# Patient Record
Sex: FEMALE | Race: BLACK OR AFRICAN AMERICAN | NOT HISPANIC OR LATINO | URBAN - METROPOLITAN AREA
[De-identification: names, ages, dates, MRNs, and addresses within clinical notes are randomized per-mention and may not be internally consistent; named-entity substitution may affect disease eponyms.]

---

## 2017-12-26 ENCOUNTER — EMERGENCY (EMERGENCY)
Facility: HOSPITAL | Age: 37
LOS: 0 days | Discharge: HOME | End: 2017-12-26

## 2017-12-26 DIAGNOSIS — K08.89 OTHER SPECIFIED DISORDERS OF TEETH AND SUPPORTING STRUCTURES: ICD-10-CM

## 2023-04-03 ENCOUNTER — NON-APPOINTMENT (OUTPATIENT)
Age: 43
End: 2023-04-03

## 2023-04-04 ENCOUNTER — EMERGENCY (EMERGENCY)
Facility: HOSPITAL | Age: 43
LOS: 0 days | Discharge: HOME | End: 2023-04-04
Attending: EMERGENCY MEDICINE
Payer: COMMERCIAL

## 2023-04-04 VITALS
DIASTOLIC BLOOD PRESSURE: 60 MMHG | HEIGHT: 61 IN | TEMPERATURE: 98 F | RESPIRATION RATE: 18 BRPM | WEIGHT: 147.05 LBS | HEART RATE: 79 BPM | SYSTOLIC BLOOD PRESSURE: 127 MMHG | OXYGEN SATURATION: 100 %

## 2023-04-04 DIAGNOSIS — R11.10 VOMITING, UNSPECIFIED: ICD-10-CM

## 2023-04-04 DIAGNOSIS — Z87.891 PERSONAL HISTORY OF NICOTINE DEPENDENCE: ICD-10-CM

## 2023-04-04 DIAGNOSIS — Z91.013 ALLERGY TO SEAFOOD: ICD-10-CM

## 2023-04-04 DIAGNOSIS — Z88.0 ALLERGY STATUS TO PENICILLIN: ICD-10-CM

## 2023-04-04 DIAGNOSIS — R10.11 RIGHT UPPER QUADRANT PAIN: ICD-10-CM

## 2023-04-04 DIAGNOSIS — R07.81 PLEURODYNIA: ICD-10-CM

## 2023-04-04 DIAGNOSIS — R11.2 NAUSEA WITH VOMITING, UNSPECIFIED: ICD-10-CM

## 2023-04-04 DIAGNOSIS — M54.9 DORSALGIA, UNSPECIFIED: ICD-10-CM

## 2023-04-04 LAB
ALBUMIN SERPL ELPH-MCNC: 4.1 G/DL — SIGNIFICANT CHANGE UP (ref 3.5–5.2)
ALP SERPL-CCNC: 102 U/L — SIGNIFICANT CHANGE UP (ref 30–115)
ALT FLD-CCNC: 8 U/L — SIGNIFICANT CHANGE UP (ref 0–41)
ANION GAP SERPL CALC-SCNC: 10 MMOL/L — SIGNIFICANT CHANGE UP (ref 7–14)
APPEARANCE UR: CLEAR — SIGNIFICANT CHANGE UP
AST SERPL-CCNC: 12 U/L — SIGNIFICANT CHANGE UP (ref 0–41)
BASOPHILS # BLD AUTO: 0.03 K/UL — SIGNIFICANT CHANGE UP (ref 0–0.2)
BASOPHILS NFR BLD AUTO: 0.4 % — SIGNIFICANT CHANGE UP (ref 0–1)
BILIRUB SERPL-MCNC: <0.2 MG/DL — SIGNIFICANT CHANGE UP (ref 0.2–1.2)
BILIRUB UR-MCNC: NEGATIVE — SIGNIFICANT CHANGE UP
BUN SERPL-MCNC: 11 MG/DL — SIGNIFICANT CHANGE UP (ref 10–20)
CALCIUM SERPL-MCNC: 9.3 MG/DL — SIGNIFICANT CHANGE UP (ref 8.4–10.5)
CHLORIDE SERPL-SCNC: 104 MMOL/L — SIGNIFICANT CHANGE UP (ref 98–110)
CO2 SERPL-SCNC: 22 MMOL/L — SIGNIFICANT CHANGE UP (ref 17–32)
COLOR SPEC: YELLOW — SIGNIFICANT CHANGE UP
CREAT SERPL-MCNC: 0.7 MG/DL — SIGNIFICANT CHANGE UP (ref 0.7–1.5)
DIFF PNL FLD: NEGATIVE — SIGNIFICANT CHANGE UP
EGFR: 111 ML/MIN/1.73M2 — SIGNIFICANT CHANGE UP
EOSINOPHIL # BLD AUTO: 0.08 K/UL — SIGNIFICANT CHANGE UP (ref 0–0.7)
EOSINOPHIL NFR BLD AUTO: 1.1 % — SIGNIFICANT CHANGE UP (ref 0–8)
GLUCOSE SERPL-MCNC: 97 MG/DL — SIGNIFICANT CHANGE UP (ref 70–99)
GLUCOSE UR QL: NEGATIVE — SIGNIFICANT CHANGE UP
HCG SERPL QL: NEGATIVE — SIGNIFICANT CHANGE UP
HCT VFR BLD CALC: 40.9 % — SIGNIFICANT CHANGE UP (ref 37–47)
HGB BLD-MCNC: 12.9 G/DL — SIGNIFICANT CHANGE UP (ref 12–16)
IMM GRANULOCYTES NFR BLD AUTO: 0.1 % — SIGNIFICANT CHANGE UP (ref 0.1–0.3)
KETONES UR-MCNC: ABNORMAL
LEUKOCYTE ESTERASE UR-ACNC: NEGATIVE — SIGNIFICANT CHANGE UP
LIDOCAIN IGE QN: 19 U/L — SIGNIFICANT CHANGE UP (ref 7–60)
LYMPHOCYTES # BLD AUTO: 2.61 K/UL — SIGNIFICANT CHANGE UP (ref 1.2–3.4)
LYMPHOCYTES # BLD AUTO: 35.9 % — SIGNIFICANT CHANGE UP (ref 20.5–51.1)
MCHC RBC-ENTMCNC: 29.3 PG — SIGNIFICANT CHANGE UP (ref 27–31)
MCHC RBC-ENTMCNC: 31.5 G/DL — LOW (ref 32–37)
MCV RBC AUTO: 92.7 FL — SIGNIFICANT CHANGE UP (ref 81–99)
MONOCYTES # BLD AUTO: 0.46 K/UL — SIGNIFICANT CHANGE UP (ref 0.1–0.6)
MONOCYTES NFR BLD AUTO: 6.3 % — SIGNIFICANT CHANGE UP (ref 1.7–9.3)
NEUTROPHILS # BLD AUTO: 4.08 K/UL — SIGNIFICANT CHANGE UP (ref 1.4–6.5)
NEUTROPHILS NFR BLD AUTO: 56.2 % — SIGNIFICANT CHANGE UP (ref 42.2–75.2)
NITRITE UR-MCNC: NEGATIVE — SIGNIFICANT CHANGE UP
NRBC # BLD: 0 /100 WBCS — SIGNIFICANT CHANGE UP (ref 0–0)
PH UR: 6 — SIGNIFICANT CHANGE UP (ref 5–8)
PLATELET # BLD AUTO: 247 K/UL — SIGNIFICANT CHANGE UP (ref 130–400)
POTASSIUM SERPL-MCNC: 4.6 MMOL/L — SIGNIFICANT CHANGE UP (ref 3.5–5)
POTASSIUM SERPL-SCNC: 4.6 MMOL/L — SIGNIFICANT CHANGE UP (ref 3.5–5)
PROT SERPL-MCNC: 6.6 G/DL — SIGNIFICANT CHANGE UP (ref 6–8)
PROT UR-MCNC: SIGNIFICANT CHANGE UP
RBC # BLD: 4.41 M/UL — SIGNIFICANT CHANGE UP (ref 4.2–5.4)
RBC # FLD: 12.2 % — SIGNIFICANT CHANGE UP (ref 11.5–14.5)
SODIUM SERPL-SCNC: 136 MMOL/L — SIGNIFICANT CHANGE UP (ref 135–146)
SP GR SPEC: 1.03 — SIGNIFICANT CHANGE UP (ref 1.01–1.03)
UROBILINOGEN FLD QL: SIGNIFICANT CHANGE UP
WBC # BLD: 7.27 K/UL — SIGNIFICANT CHANGE UP (ref 4.8–10.8)
WBC # FLD AUTO: 7.27 K/UL — SIGNIFICANT CHANGE UP (ref 4.8–10.8)

## 2023-04-04 PROCEDURE — 87086 URINE CULTURE/COLONY COUNT: CPT

## 2023-04-04 PROCEDURE — 85025 COMPLETE CBC W/AUTO DIFF WBC: CPT

## 2023-04-04 PROCEDURE — 76705 ECHO EXAM OF ABDOMEN: CPT | Mod: 26

## 2023-04-04 PROCEDURE — 81003 URINALYSIS AUTO W/O SCOPE: CPT

## 2023-04-04 PROCEDURE — 83690 ASSAY OF LIPASE: CPT

## 2023-04-04 PROCEDURE — 99285 EMERGENCY DEPT VISIT HI MDM: CPT

## 2023-04-04 PROCEDURE — 96375 TX/PRO/DX INJ NEW DRUG ADDON: CPT

## 2023-04-04 PROCEDURE — 36415 COLL VENOUS BLD VENIPUNCTURE: CPT

## 2023-04-04 PROCEDURE — 76705 ECHO EXAM OF ABDOMEN: CPT

## 2023-04-04 PROCEDURE — 71046 X-RAY EXAM CHEST 2 VIEWS: CPT | Mod: 26

## 2023-04-04 PROCEDURE — 71046 X-RAY EXAM CHEST 2 VIEWS: CPT

## 2023-04-04 PROCEDURE — 99284 EMERGENCY DEPT VISIT MOD MDM: CPT | Mod: 25

## 2023-04-04 PROCEDURE — 84703 CHORIONIC GONADOTROPIN ASSAY: CPT

## 2023-04-04 PROCEDURE — 80053 COMPREHEN METABOLIC PANEL: CPT

## 2023-04-04 PROCEDURE — 96374 THER/PROPH/DIAG INJ IV PUSH: CPT

## 2023-04-04 RX ORDER — SODIUM CHLORIDE 9 MG/ML
1000 INJECTION INTRAMUSCULAR; INTRAVENOUS; SUBCUTANEOUS ONCE
Refills: 0 | Status: COMPLETED | OUTPATIENT
Start: 2023-04-04 | End: 2023-04-04

## 2023-04-04 RX ORDER — FAMOTIDINE 10 MG/ML
20 INJECTION INTRAVENOUS ONCE
Refills: 0 | Status: COMPLETED | OUTPATIENT
Start: 2023-04-04 | End: 2023-04-04

## 2023-04-04 RX ORDER — EPINEPHRINE 0.3 MG/.3ML
0.3 INJECTION INTRAMUSCULAR; SUBCUTANEOUS
Qty: 1 | Refills: 0
Start: 2023-04-04

## 2023-04-04 RX ORDER — ONDANSETRON 8 MG/1
4 TABLET, FILM COATED ORAL ONCE
Refills: 0 | Status: COMPLETED | OUTPATIENT
Start: 2023-04-04 | End: 2023-04-04

## 2023-04-04 RX ADMIN — SODIUM CHLORIDE 1000 MILLILITER(S): 9 INJECTION INTRAMUSCULAR; INTRAVENOUS; SUBCUTANEOUS at 20:40

## 2023-04-04 RX ADMIN — ONDANSETRON 4 MILLIGRAM(S): 8 TABLET, FILM COATED ORAL at 20:35

## 2023-04-04 RX ADMIN — FAMOTIDINE 20 MILLIGRAM(S): 10 INJECTION INTRAVENOUS at 20:40

## 2023-04-04 NOTE — ED ADULT TRIAGE NOTE - CHIEF COMPLAINT QUOTE
pt sent by [facility], pt went to [facility] for n/v and abdominal pain. pt states she had allergic reaction on sunday to a pastry and took PO benadryl to relieve symptoms.

## 2023-04-04 NOTE — ED ADULT NURSE NOTE - CHIEF COMPLAINT QUOTE
pt sent by [facility], pt went to [facility] for n/v and abdominal pain. pt states she had allergic reaction on sunday to a pastry and took PO benadryl to relieve symptoms.

## 2023-04-04 NOTE — ED PROVIDER NOTE - ATTENDING APP SHARED VISIT CONTRIBUTION OF CARE
43 year-old female with past medical history of benign breast cyst and food allergies (shellfish, possibly other things) presents to the ER today for nausea/vomiting X1, associated with RUQ pain that radiates around to her back.  Pain is intermittent in nature and been sharp. Today pain occurred after eating. States she noticed pain to the lower right rib area, which sometimes radiates to the right upper quadrant area as well.  Denies any diarrhea/cough/URI symptoms/chest pain/shortness of breath/palpitations/rash/trauma/leg swelling/calf tenderness.  Denies  complaints, denies any GYN complaints.  LMP March 26.    On exam patient has some mild discomfort with palpation to the right anterior   lower ribs/right upper quadrant area, no mass, nondistended, no rebound or guarding, + BS, has some radiation to the right posterior rib region with no overlying skin changes/swelling/bruising to the back.  No flank tenderness.  Remainder of exam as per PA note    A/P we will check labs, x-ray, right upper quadrant sono and reassess    ALL: pcn-->hives, shellfish-->throat swelling  PMH denies  Meds denies  SH: former smoker, no etoh  [facility] [address] [facility]

## 2023-04-04 NOTE — ED PROVIDER NOTE - PHYSICAL EXAMINATION
VITAL SIGNS: I have reviewed nursing notes and confirm.  CONSTITUTIONAL:  in no acute distress.  SKIN: Skin exam is warm and dry, no acute rash.  HEAD: Normocephalic; atraumatic.  EYES: PERRL, EOM intact; conjunctiva and sclera clear.  ENT: No nasal discharge; airway clear.  CARD: S1, S2 normal; no murmurs, gallops, or rubs. Regular rate and rhythm.  RESP: No wheezes, rales or rhonchi. Speaking in full sentences.   ABD: Normal bowel sounds; soft; non-distended; RUQ tenderness; No rebound or guarding. No CVA tenderness.  EXT: Normal ROM. No clubbing, cyanosis or edema.

## 2023-04-04 NOTE — ED PROVIDER NOTE - NSFOLLOWUPINSTRUCTIONS_ED_ALL_ED_FT
Please follow up with your primary care doctor and gastroenterology in the next 1-3 days    ********* Our Emergency Department Referral Coordinators will be reaching out to you in the next 24-48 hours from 9:00am to 5:00pm with a follow up appointment. Please expect a phone call from the hospital in that time frame. If you do not receive a call or if you have any questions or concerns, you can reach them at [phone] or [phone].     Acute Abdominal Pain    WHAT YOU NEED TO KNOW:    The cause of your abdominal pain may not be found. If a cause is found, treatment will depend on what the cause is.     DISCHARGE INSTRUCTIONS:    Return to the emergency department if:     You vomit blood or cannot stop vomiting.      You have blood in your bowel movement or it looks like tar.       You have bleeding from your rectum.       Your abdomen is larger than usual, more painful, and hard.       You have severe pain in your abdomen.       You stop passing gas and having bowel movements.       You feel weak, dizzy, or faint.    Contact your healthcare provider if:     You have a fever.      You have new signs and symptoms.      Your symptoms do not get better with treatment.       You have questions or concerns about your condition or care.    Medicines may be given to decrease pain, treat an infection, and manage your symptoms. Take your medicine as directed. Call your healthcare provider if you think your medicine is not helping or if you have side effects. Tell him if you are allergic to any medicine. Keep a list of the medicines, vitamins, and herbs you take. Include the amounts, and when and why you take them. Bring the list or the pill bottles to follow-up visits. Carry your medicine list with you in case of an emergency.    Manage your symptoms:     Apply heat on your abdomen for 20 to 30 minutes every 2 hours for as many days as directed. Heat helps decrease pain and muscle spasms.       Manage your stress. Stress may cause abdominal pain. Your healthcare provider may recommend relaxation techniques and deep breathing exercises to help decrease your stress. Your healthcare provider may recommend you talk to someone about your stress or anxiety, such as a counselor or a trusted friend. Get plenty of sleep and exercise regularly.       Limit or do not drink alcohol. Alcohol can make your abdominal pain worse. Ask your healthcare provider if it is safe for you to drink alcohol. Also ask how much is safe for you to drink.       Do not smoke. Nicotine and other chemicals in cigarettes can damage your esophagus and stomach. Ask your healthcare provider for information if you currently smoke and need help to quit. E-cigarettes or smokeless tobacco still contain nicotine. Talk to your healthcare provider before you use these products.     Make changes to the food you eat as directed: Do not eat foods that cause abdominal pain or other symptoms. Eat small meals more often.     Eat more high-fiber foods if you are constipated. High-fiber foods include fruits, vegetables, whole-grain foods, and legumes.       Do not eat foods that cause gas if you have bloating. Examples include broccoli, cabbage, and cauliflower. Do not drink soda or carbonated drinks, because these may also cause gas.       Do not eat foods or drinks that contain sorbitol or fructose if you have diarrhea and bloating. Some examples are fruit juices, candy, jelly, and sugar-free gum.       Do not eat high-fat foods, such as fried foods, cheeseburgers, hot dogs, and desserts.      Limit or do not drink caffeine. Caffeine may make symptoms, such as heart burn or nausea, worse.       Drink plenty of liquids to prevent dehydration from diarrhea or vomiting. Ask your healthcare provider how much liquid to drink each day and which liquids are best for you.     Follow up with your healthcare provider as directed: Write down your questions so you remember to ask them during your visits.       © Copyright [employer] 2019 All illustrations and images included in CareNotes are the copyrighted property of [employer] or "[employer]"

## 2023-04-04 NOTE — ED PROVIDER NOTE - PATIENT PORTAL LINK FT
You can access the FollowMyHealth Patient Portal offered by [facility] by registering at the following website: [email or web address]. By joining [employer]’s FollowMyHealth portal, you will also be able to view your health information using other applications (apps) compatible with our system.

## 2023-04-04 NOTE — ED PROVIDER NOTE - OBJECTIVE STATEMENT
43 female with no history presents to ED for right upper quadrant pain radiating to her back that started today.  Patient states she was at work this morning when she started to feel the symptoms come on she had 1 episode of vomiting this morning associated with eating.  No fevers or chills but states that the pain is intermittent in its intensity.  Went to [facility] today and was sent to ED for further evaluation.  Denies CP, SOB, dysuria, hematuria, diarrhea.  LMP March 26

## 2023-04-04 NOTE — ED PROVIDER NOTE - CLINICAL SUMMARY MEDICAL DECISION MAKING FREE TEXT BOX
43 year-old female with past medical history of benign breast cyst and food allergies (shellfish, possibly other things) presents to the ER today for nausea/vomiting X1, associated with RUQ pain that radiates around to her back.  Pain is intermittent in nature and been sharp. Today pain occurred after eating. States she noticed pain to the lower right rib area, which sometimes radiates to the right upper quadrant area as well.  Denies any diarrhea/cough/URI symptoms/chest pain/shortness of breath/palpitations/rash/trauma/leg swelling/calf tenderness.  Denies  complaints, denies any GYN complaints.  LMP March 26.    Workup reviewed. No acute findings. Pt feeling better post meds. Requesting to go home. TO dc with referral to GI

## 2023-04-06 LAB
CULTURE RESULTS: SIGNIFICANT CHANGE UP
SPECIMEN SOURCE: SIGNIFICANT CHANGE UP

## 2024-03-13 ENCOUNTER — EMERGENCY (EMERGENCY)
Facility: HOSPITAL | Age: 44
LOS: 0 days | Discharge: HOME | End: 2024-03-13
Attending: EMERGENCY MEDICINE
Payer: COMMERCIAL

## 2024-03-13 VITALS
HEART RATE: 71 BPM | SYSTOLIC BLOOD PRESSURE: 128 MMHG | WEIGHT: 138.01 LBS | DIASTOLIC BLOOD PRESSURE: 78 MMHG | TEMPERATURE: 99 F | RESPIRATION RATE: 18 BRPM | OXYGEN SATURATION: 96 %

## 2024-03-13 DIAGNOSIS — Z20.822 CONTACT WITH AND (SUSPECTED) EXPOSURE TO COVID-19: ICD-10-CM

## 2024-03-13 DIAGNOSIS — R07.0 PAIN IN THROAT: ICD-10-CM

## 2024-03-13 DIAGNOSIS — R51.9 HEADACHE, UNSPECIFIED: ICD-10-CM

## 2024-03-13 DIAGNOSIS — Z88.0 ALLERGY STATUS TO PENICILLIN: ICD-10-CM

## 2024-03-13 DIAGNOSIS — B34.9 VIRAL INFECTION, UNSPECIFIED: ICD-10-CM

## 2024-03-13 DIAGNOSIS — Z91.041 RADIOGRAPHIC DYE ALLERGY STATUS: ICD-10-CM

## 2024-03-13 DIAGNOSIS — Z91.013 ALLERGY TO SEAFOOD: ICD-10-CM

## 2024-03-13 LAB
FLUAV AG NPH QL: SIGNIFICANT CHANGE UP
FLUBV AG NPH QL: SIGNIFICANT CHANGE UP
RSV RNA NPH QL NAA+NON-PROBE: SIGNIFICANT CHANGE UP
SARS-COV-2 RNA SPEC QL NAA+PROBE: SIGNIFICANT CHANGE UP

## 2024-03-13 PROCEDURE — 99283 EMERGENCY DEPT VISIT LOW MDM: CPT | Mod: 25

## 2024-03-13 PROCEDURE — 99284 EMERGENCY DEPT VISIT MOD MDM: CPT

## 2024-03-13 PROCEDURE — 0241U: CPT

## 2024-03-13 PROCEDURE — 71046 X-RAY EXAM CHEST 2 VIEWS: CPT | Mod: 26

## 2024-03-13 PROCEDURE — 71046 X-RAY EXAM CHEST 2 VIEWS: CPT

## 2024-03-13 RX ORDER — DEXAMETHASONE 0.5 MG/5ML
10 ELIXIR ORAL ONCE
Refills: 0 | Status: COMPLETED | OUTPATIENT
Start: 2024-03-13 | End: 2024-03-13

## 2024-03-13 RX ADMIN — Medication 10 MILLIGRAM(S): at 15:14

## 2024-03-13 NOTE — ED PROVIDER NOTE - PHYSICAL EXAMINATION
VITAL SIGNS: I have reviewed nursing notes and confirm.  CONSTITUTIONAL:  in no acute distress.  SKIN: Skin exam is warm and dry, no acute rash.  HEAD: Normocephalic; atraumatic.  EYES:  EOM intact; conjunctiva and sclera clear.  ENT: No nasal discharge; airway clear  NECK: Supple; non tender.  CARD: S1, S2 normal; no murmurs, gallops, or rubs. Regular rate and rhythm.  RESP: No wheezes, rales or rhonchi. Speaking in full sentences.   ABD: soft; non-distended; non-tender; No rebound or guarding.

## 2024-03-13 NOTE — ED PROVIDER NOTE - CLINICAL SUMMARY MEDICAL DECISION MAKING FREE TEXT BOX
44-year-old female with no stated PMHx, in ER with complaints of URI symptoms for the past 3 days.  + Nasal congestion/rhinorrhea, + sore throat, + mild cough, + chills, + HA, + body aches.  Denies any CP/SOB, no abdominal pain.  No N/V/D.  No urinary symptoms.  No extremity pain/swelling.  No rash.  No recent travel.  PE - nad, nc/at, eomi, perrl, op - clear, mmm, no pharyngeal erythema, neck supple, cta b/l, no w/r/r, rrr, abd- soft, nt/nd, nabs, from x 4, no LE swelling/tenderness, A&O x 3, cn 2-12 intact, no focal motor/sensory deficits.   -CXR negative, nasal swab sent.  Patient well-appearing in ER, to [place] home.  Told follow-up PMD and to return to ER if she feels worse, or for any new/concerning symptoms.  Patient understands and agrees with plan.

## 2024-03-13 NOTE — ED PROVIDER NOTE - OBJECTIVE STATEMENT
44 female no medical history presenting to ED for evaluation of headache and associated flulike symptoms for the last 3 days.  Patient states she has had associated body aches, throat pain, headache.  Denies any recent travel/known sick contacts, fevers, chills, N, V, D, abdominal pain, cough

## 2024-03-13 NOTE — ED PROVIDER NOTE - PATIENT PORTAL LINK FT
You can access the FollowMyHealth Patient Portal offered by [facility] by registering at the following website: [email or web address]. By joining [employer]’s FollowMyHealth portal, you will also be able to view your health information using other applications (apps) compatible with our system.

## 2024-06-10 ENCOUNTER — EMERGENCY (EMERGENCY)
Facility: HOSPITAL | Age: 44
LOS: 0 days | Discharge: HOME | End: 2024-06-10
Attending: EMERGENCY MEDICINE
Payer: COMMERCIAL

## 2024-06-10 VITALS
HEART RATE: 89 BPM | WEIGHT: 154.1 LBS | DIASTOLIC BLOOD PRESSURE: 51 MMHG | OXYGEN SATURATION: 98 % | SYSTOLIC BLOOD PRESSURE: 133 MMHG | HEIGHT: 60 IN | TEMPERATURE: 98 F | RESPIRATION RATE: 18 BRPM

## 2024-06-10 DIAGNOSIS — F17.200 NICOTINE DEPENDENCE, UNSPECIFIED, UNCOMPLICATED: ICD-10-CM
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
## 2024-06-10 DIAGNOSIS — S69.81XA OTHER SPECIFIED INJURIES OF RIGHT WRIST, HAND AND FINGER(S), INITIAL ENCOUNTER: ICD-10-CM

## 2024-06-10 DIAGNOSIS — Z91.041 RADIOGRAPHIC DYE ALLERGY STATUS: ICD-10-CM

## 2024-06-10 DIAGNOSIS — Z91.013 ALLERGY TO SEAFOOD: ICD-10-CM
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
## 2024-06-10 DIAGNOSIS — Z88.0 ALLERGY STATUS TO PENICILLIN: ICD-10-CM

## 2024-06-10 DIAGNOSIS — M25.531 PAIN IN RIGHT WRIST: ICD-10-CM

## 2024-06-10 PROCEDURE — 29125 APPL SHORT ARM SPLINT STATIC: CPT | Mod: RT

## 2024-06-10 PROCEDURE — 73130 X-RAY EXAM OF HAND: CPT | Mod: 26,RT

## 2024-06-10 PROCEDURE — 99284 EMERGENCY DEPT VISIT MOD MDM: CPT | Mod: 25

## 2024-06-10 PROCEDURE — 73130 X-RAY EXAM OF HAND: CPT | Mod: RT

## 2024-06-10 PROCEDURE — 99283 EMERGENCY DEPT VISIT LOW MDM: CPT | Mod: 25

## 2024-06-10 RX ORDER — ACETAMINOPHEN 500 MG
975 TABLET ORAL ONCE
Refills: 0 | Status: COMPLETED | OUTPATIENT
Start: 2024-06-10 | End: 2024-06-10

## 2024-06-10 RX ADMIN — Medication 975 MILLIGRAM(S): at 18:32
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
## 2024-06-10 NOTE — ED PROVIDER NOTE - PATIENT PORTAL LINK FT
You can access the FollowMyHealth Patient Portal offered by [facility] by registering at the following website: [email or web address]. By joining [employer]’s FollowMyHealth portal, you will also be able to view your health information using other applications (apps) compatible with our system.

## 2024-06-28 ENCOUNTER — APPOINTMENT (OUTPATIENT)
Dept: ORTHOPEDIC SURGERY | Facility: CLINIC | Age: 44
End: 2024-06-28
Payer: COMMERCIAL

## 2024-06-28 VITALS — HEIGHT: 60 IN | BODY MASS INDEX: 29.45 KG/M2 | WEIGHT: 150 LBS

## 2024-06-28 DIAGNOSIS — S63.612A UNSPECIFIED SPRAIN OF RIGHT MIDDLE FINGER, INITIAL ENCOUNTER: ICD-10-CM

## 2024-06-28 DIAGNOSIS — S63.91XA SPRAIN OF UNSPECIFIED PART OF RIGHT WRIST AND HAND, INITIAL ENCOUNTER: ICD-10-CM

## 2024-06-28 PROBLEM — Z00.00 ENCOUNTER FOR PREVENTIVE HEALTH EXAMINATION: Status: ACTIVE | Noted: 2024-06-28

## 2024-06-28 PROCEDURE — 73140 X-RAY EXAM OF FINGER(S): CPT | Mod: RT

## 2024-06-28 PROCEDURE — 99203 OFFICE O/P NEW LOW 30 MIN: CPT | Mod: ACP

## 2024-06-30 PROBLEM — Z78.9 OTHER SPECIFIED HEALTH STATUS: Chronic | Status: ACTIVE | Noted: 2024-06-10

## 2024-07-25 ENCOUNTER — APPOINTMENT (OUTPATIENT)
Dept: ORTHOPEDIC SURGERY | Facility: CLINIC | Age: 44
End: 2024-07-25

## 2024-08-04 ENCOUNTER — NON-APPOINTMENT (OUTPATIENT)
Age: 44
End: 2024-08-04

## 2024-08-08 ENCOUNTER — APPOINTMENT (OUTPATIENT)
Dept: ORTHOPEDIC SURGERY | Facility: CLINIC | Age: 44
End: 2024-08-08

## 2024-08-12 ENCOUNTER — NON-APPOINTMENT (OUTPATIENT)
Age: 44
End: 2024-08-12

## 2024-09-11 ENCOUNTER — NON-APPOINTMENT (OUTPATIENT)
Age: 44
End: 2024-09-11

## 2025-03-10 ENCOUNTER — INPATIENT (INPATIENT)
Facility: HOSPITAL | Age: 45
LOS: 0 days | Discharge: HOME | DRG: 74 | End: 2025-03-11
Attending: STUDENT IN AN ORGANIZED HEALTH CARE EDUCATION/TRAINING PROGRAM | Admitting: STUDENT IN AN ORGANIZED HEALTH CARE EDUCATION/TRAINING PROGRAM
Payer: COMMERCIAL

## 2025-03-10 VITALS
WEIGHT: 167.99 LBS | HEART RATE: 73 BPM | SYSTOLIC BLOOD PRESSURE: 129 MMHG | DIASTOLIC BLOOD PRESSURE: 68 MMHG | OXYGEN SATURATION: 100 % | TEMPERATURE: 98 F

## 2025-03-10 DIAGNOSIS — R53.1 WEAKNESS: ICD-10-CM

## 2025-03-10 LAB
ALBUMIN SERPL ELPH-MCNC: 4 G/DL — SIGNIFICANT CHANGE UP (ref 3.5–5.2)
ALP SERPL-CCNC: 138 U/L — HIGH (ref 30–115)
ALT FLD-CCNC: 17 U/L — SIGNIFICANT CHANGE UP (ref 0–41)
ANION GAP SERPL CALC-SCNC: 10 MMOL/L — SIGNIFICANT CHANGE UP (ref 7–14)
APTT BLD: 33.2 SEC — SIGNIFICANT CHANGE UP (ref 27–39.2)
AST SERPL-CCNC: 18 U/L — SIGNIFICANT CHANGE UP (ref 0–41)
BASOPHILS # BLD AUTO: 0.04 K/UL — SIGNIFICANT CHANGE UP (ref 0–0.2)
BASOPHILS NFR BLD AUTO: 0.5 % — SIGNIFICANT CHANGE UP (ref 0–1)
BILIRUB SERPL-MCNC: <0.2 MG/DL — SIGNIFICANT CHANGE UP (ref 0.2–1.2)
BUN SERPL-MCNC: 10 MG/DL — SIGNIFICANT CHANGE UP (ref 10–20)
CALCIUM SERPL-MCNC: 8.8 MG/DL — SIGNIFICANT CHANGE UP (ref 8.4–10.5)
CHLORIDE SERPL-SCNC: 101 MMOL/L — SIGNIFICANT CHANGE UP (ref 98–110)
CK MB CFR SERPL CALC: <1 NG/ML — SIGNIFICANT CHANGE UP (ref 0.6–6.3)
CK SERPL-CCNC: 103 U/L — SIGNIFICANT CHANGE UP (ref 0–225)
CO2 SERPL-SCNC: 24 MMOL/L — SIGNIFICANT CHANGE UP (ref 17–32)
CREAT SERPL-MCNC: 0.7 MG/DL — SIGNIFICANT CHANGE UP (ref 0.7–1.5)
D DIMER BLD IA.RAPID-MCNC: <150 NG/ML DDU — SIGNIFICANT CHANGE UP
EGFR: 109 ML/MIN/1.73M2 — SIGNIFICANT CHANGE UP
EGFR: 109 ML/MIN/1.73M2 — SIGNIFICANT CHANGE UP
EOSINOPHIL # BLD AUTO: 0.22 K/UL — SIGNIFICANT CHANGE UP (ref 0–0.7)
EOSINOPHIL NFR BLD AUTO: 2.5 % — SIGNIFICANT CHANGE UP (ref 0–8)
GLUCOSE BLDC GLUCOMTR-MCNC: 193 MG/DL — HIGH (ref 70–99)
GLUCOSE SERPL-MCNC: 105 MG/DL — HIGH (ref 70–99)
HCG SERPL QL: NEGATIVE — SIGNIFICANT CHANGE UP
HCT VFR BLD CALC: 41.9 % — SIGNIFICANT CHANGE UP (ref 37–47)
HGB BLD-MCNC: 13.2 G/DL — SIGNIFICANT CHANGE UP (ref 12–16)
IMM GRANULOCYTES NFR BLD AUTO: 0.3 % — SIGNIFICANT CHANGE UP (ref 0.1–0.3)
INR BLD: 0.91 RATIO — SIGNIFICANT CHANGE UP (ref 0.65–1.3)
LYMPHOCYTES # BLD AUTO: 2.51 K/UL — SIGNIFICANT CHANGE UP (ref 1.2–3.4)
LYMPHOCYTES # BLD AUTO: 28.9 % — SIGNIFICANT CHANGE UP (ref 20.5–51.1)
MCHC RBC-ENTMCNC: 28.6 PG — SIGNIFICANT CHANGE UP (ref 27–31)
MCHC RBC-ENTMCNC: 31.5 G/DL — LOW (ref 32–37)
MCV RBC AUTO: 90.9 FL — SIGNIFICANT CHANGE UP (ref 81–99)
MONOCYTES # BLD AUTO: 0.49 K/UL — SIGNIFICANT CHANGE UP (ref 0.1–0.6)
MONOCYTES NFR BLD AUTO: 5.6 % — SIGNIFICANT CHANGE UP (ref 1.7–9.3)
NEUTROPHILS # BLD AUTO: 5.4 K/UL — SIGNIFICANT CHANGE UP (ref 1.4–6.5)
NEUTROPHILS NFR BLD AUTO: 62.2 % — SIGNIFICANT CHANGE UP (ref 42.2–75.2)
NRBC BLD AUTO-RTO: 0 /100 WBCS — SIGNIFICANT CHANGE UP (ref 0–0)
PLATELET # BLD AUTO: 291 K/UL — SIGNIFICANT CHANGE UP (ref 130–400)
PMV BLD: 10.9 FL — HIGH (ref 7.4–10.4)
POTASSIUM SERPL-MCNC: 4.5 MMOL/L — SIGNIFICANT CHANGE UP (ref 3.5–5)
POTASSIUM SERPL-SCNC: 4.5 MMOL/L — SIGNIFICANT CHANGE UP (ref 3.5–5)
PROT SERPL-MCNC: 6.5 G/DL — SIGNIFICANT CHANGE UP (ref 6–8)
PROTHROM AB SERPL-ACNC: 10.7 SEC — SIGNIFICANT CHANGE UP (ref 9.95–12.87)
RBC # BLD: 4.61 M/UL — SIGNIFICANT CHANGE UP (ref 4.2–5.4)
RBC # FLD: 12.6 % — SIGNIFICANT CHANGE UP (ref 11.5–14.5)
SODIUM SERPL-SCNC: 135 MMOL/L — SIGNIFICANT CHANGE UP (ref 135–146)
TROPONIN T, HIGH SENSITIVITY RESULT: <6 NG/L — SIGNIFICANT CHANGE UP (ref 6–13)
TROPONIN T, HIGH SENSITIVITY RESULT: <6 NG/L — SIGNIFICANT CHANGE UP (ref 6–13)
WBC # BLD: 8.69 K/UL — SIGNIFICANT CHANGE UP (ref 4.8–10.8)
WBC # FLD AUTO: 8.69 K/UL — SIGNIFICANT CHANGE UP (ref 4.8–10.8)

## 2025-03-10 PROCEDURE — 99223 1ST HOSP IP/OBS HIGH 75: CPT

## 2025-03-10 PROCEDURE — 82962 GLUCOSE BLOOD TEST: CPT

## 2025-03-10 PROCEDURE — 99285 EMERGENCY DEPT VISIT HI MDM: CPT

## 2025-03-10 PROCEDURE — 84480 ASSAY TRIIODOTHYRONINE (T3): CPT

## 2025-03-10 PROCEDURE — 36415 COLL VENOUS BLD VENIPUNCTURE: CPT

## 2025-03-10 PROCEDURE — 70498 CT ANGIOGRAPHY NECK: CPT | Mod: 26

## 2025-03-10 PROCEDURE — 93005 ELECTROCARDIOGRAM TRACING: CPT

## 2025-03-10 PROCEDURE — 84484 ASSAY OF TROPONIN QUANT: CPT

## 2025-03-10 PROCEDURE — 71045 X-RAY EXAM CHEST 1 VIEW: CPT | Mod: 26

## 2025-03-10 PROCEDURE — 70551 MRI BRAIN STEM W/O DYE: CPT | Mod: 26

## 2025-03-10 PROCEDURE — 97162 PT EVAL MOD COMPLEX 30 MIN: CPT | Mod: GP

## 2025-03-10 PROCEDURE — 85379 FIBRIN DEGRADATION QUANT: CPT

## 2025-03-10 PROCEDURE — 99222 1ST HOSP IP/OBS MODERATE 55: CPT

## 2025-03-10 PROCEDURE — 70551 MRI BRAIN STEM W/O DYE: CPT | Mod: MC

## 2025-03-10 PROCEDURE — 84100 ASSAY OF PHOSPHORUS: CPT

## 2025-03-10 PROCEDURE — 0042T: CPT

## 2025-03-10 PROCEDURE — 83036 HEMOGLOBIN GLYCOSYLATED A1C: CPT

## 2025-03-10 PROCEDURE — G0378: CPT

## 2025-03-10 PROCEDURE — 84436 ASSAY OF TOTAL THYROXINE: CPT

## 2025-03-10 PROCEDURE — 93010 ELECTROCARDIOGRAM REPORT: CPT | Mod: 77

## 2025-03-10 PROCEDURE — 80061 LIPID PANEL: CPT

## 2025-03-10 PROCEDURE — 84443 ASSAY THYROID STIM HORMONE: CPT

## 2025-03-10 PROCEDURE — 80053 COMPREHEN METABOLIC PANEL: CPT

## 2025-03-10 PROCEDURE — 70496 CT ANGIOGRAPHY HEAD: CPT | Mod: 26

## 2025-03-10 PROCEDURE — 70450 CT HEAD/BRAIN W/O DYE: CPT | Mod: 26

## 2025-03-10 PROCEDURE — 83735 ASSAY OF MAGNESIUM: CPT

## 2025-03-10 PROCEDURE — 85025 COMPLETE CBC W/AUTO DIFF WBC: CPT

## 2025-03-10 PROCEDURE — 93306 TTE W/DOPPLER COMPLETE: CPT

## 2025-03-10 RX ORDER — MELATONIN 5 MG
3 TABLET ORAL AT BEDTIME
Refills: 0 | Status: DISCONTINUED | OUTPATIENT
Start: 2025-03-10 | End: 2025-03-11

## 2025-03-10 RX ORDER — ASPIRIN 325 MG
81 TABLET ORAL DAILY
Refills: 0 | Status: DISCONTINUED | OUTPATIENT
Start: 2025-03-10 | End: 2025-03-11

## 2025-03-10 RX ORDER — ASPIRIN 325 MG
81 TABLET ORAL DAILY
Refills: 0 | Status: DISCONTINUED | OUTPATIENT
Start: 2025-03-10 | End: 2025-03-10

## 2025-03-10 RX ORDER — ASPIRIN 325 MG
243 TABLET ORAL ONCE
Refills: 0 | Status: COMPLETED | OUTPATIENT
Start: 2025-03-10 | End: 2025-03-10

## 2025-03-10 RX ORDER — ASPIRIN 325 MG
244 TABLET ORAL ONCE
Refills: 0 | Status: DISCONTINUED | OUTPATIENT
Start: 2025-03-10 | End: 2025-03-10

## 2025-03-10 RX ORDER — ENOXAPARIN SODIUM 100 MG/ML
40 INJECTION SUBCUTANEOUS EVERY 24 HOURS
Refills: 0 | Status: DISCONTINUED | OUTPATIENT
Start: 2025-03-10 | End: 2025-03-11

## 2025-03-10 RX ORDER — CLOPIDOGREL BISULFATE 75 MG/1
300 TABLET, FILM COATED ORAL ONCE
Refills: 0 | Status: COMPLETED | OUTPATIENT
Start: 2025-03-10 | End: 2025-03-10

## 2025-03-10 RX ORDER — METHYLPREDNISOLONE ACETATE 80 MG/ML
125 INJECTION, SUSPENSION INTRA-ARTICULAR; INTRALESIONAL; INTRAMUSCULAR; SOFT TISSUE ONCE
Refills: 0 | Status: COMPLETED | OUTPATIENT
Start: 2025-03-10 | End: 2025-03-10

## 2025-03-10 RX ORDER — DIPHENHYDRAMINE HCL 12.5MG/5ML
50 ELIXIR ORAL ONCE
Refills: 0 | Status: COMPLETED | OUTPATIENT
Start: 2025-03-10 | End: 2025-03-10

## 2025-03-10 RX ORDER — ASPIRIN 325 MG
243 TABLET ORAL ONCE
Refills: 0 | Status: DISCONTINUED | OUTPATIENT
Start: 2025-03-10 | End: 2025-03-10

## 2025-03-10 RX ORDER — CLOPIDOGREL BISULFATE 75 MG/1
75 TABLET, FILM COATED ORAL DAILY
Refills: 0 | Status: DISCONTINUED | OUTPATIENT
Start: 2025-03-10 | End: 2025-03-11

## 2025-03-10 RX ORDER — ATORVASTATIN CALCIUM 80 MG/1
80 TABLET, FILM COATED ORAL AT BEDTIME
Refills: 0 | Status: DISCONTINUED | OUTPATIENT
Start: 2025-03-10 | End: 2025-03-11

## 2025-03-10 RX ORDER — ACETAMINOPHEN 500 MG/5ML
650 LIQUID (ML) ORAL EVERY 6 HOURS
Refills: 0 | Status: DISCONTINUED | OUTPATIENT
Start: 2025-03-10 | End: 2025-03-11

## 2025-03-10 RX ADMIN — CLOPIDOGREL BISULFATE 300 MILLIGRAM(S): 75 TABLET, FILM COATED ORAL at 21:38

## 2025-03-10 RX ADMIN — Medication 81 MILLIGRAM(S): at 21:37

## 2025-03-10 RX ADMIN — Medication 50 MILLIGRAM(S): at 12:12

## 2025-03-10 RX ADMIN — METHYLPREDNISOLONE ACETATE 125 MILLIGRAM(S): 80 INJECTION, SUSPENSION INTRA-ARTICULAR; INTRALESIONAL; INTRAMUSCULAR; SOFT TISSUE at 12:10

## 2025-03-10 RX ADMIN — Medication 243 MILLIGRAM(S): at 21:37

## 2025-03-10 RX ADMIN — ATORVASTATIN CALCIUM 80 MILLIGRAM(S): 80 TABLET, FILM COATED ORAL at 21:37

## 2025-03-10 NOTE — CHART NOTE - NSCHARTNOTEFT_GEN_A_CORE
Neurovascular Acceptance Note  Accepting Neurologist: Korey Lopez  Acceptance date: 3/10/25    Patient is a 46 yo right handed  female with npmhx , not on any meications or supplements at home, + smoker (3-4 cigarettes /day), family history of father with Heart disease and stents at age 61 yo, mother + smoker with ? COPD, denies any family history of stroke in young presented today for midchest pain. Patient states that she experienced chest pain last tuesday which resolved spontaneously . Her chest pain returned this morning accompanied with right arm numbness/ tingling, b/l blurry vision and light headededness and she came to ed for further evaluation. C/O chest pain trop x 2 is negative and patient was initially admitted to medical team for chest pain work up.    NIHSS 2  Mental status (0-2): 0   Questions (0-2):0   Commands (0-2):0   Best Gaze (0-2):0   VF (0-3): 0   Facial weakness (0-3):0   RUE (0-4): 0   LUE (0-4): 0   RLE (0-4): 1   LLE (0-4): 0   Sensation (0-2):1   Ataxia (0-2): 0   Aphasia (0-3): 0   Dysarthria (0-2):0   Extinction (0-2): 0     mRs Score: 0    Labs and Imaging so far  []CTH N/C : negative for acute findings  []CT PERFUSION: No evidence of perfusion abnormality.  []CTA HEAD: No evidence of flow-limiting stenosis, occlusion or aneurysm.  []CTA NECK: No evidence of carotid or vertebral artery stenosis.  []MRI BRAIN N/C: Faint focus of restricted diffusion within the left ventral tawana raising concern for acute infarct.      #ASSESSMENT  -MRI Brain revealed Left ventral pontine acute infarct: S/p dapt load with asa 325mg +[id number] x1  -Chest Pain : Trop x 2 negative , cardiology consultation pending    PLAN:  -Transfer patient to neurology service under Dr Korey Lopez  -Continue Telemonitoring  -Neuro checks and vital signs q 4 hours  -S/p DAPT load , continue asa 81 mg + plavix 75mg q daily starting 3/11  -Lipitor 80 mg q daily  -Keep sbp 120-180  -ECHO with bubble study  -PT/REHAB evaluation and treat as needed  -Check LDL, Lipid profile, TSH  -DVT  Prophylaxis with Lovenox 40 mg sq and SCDs bilaterally  -Case was discussed with neurology attending on call Dr Korey Lopez. Neurovascular Acceptance Note  Accepting Neurologist: Korey Lopez  Acceptance date: 3/10/25    Patient is a 44 yo right handed  female with npmhx , not on any meications or supplements at home, + smoker (3-4 cigarettes /day), family history of father with Heart disease and stents at age 61 yo, mother + smoker with ? COPD, denies any family history of stroke in young presented today for midchest pain. Patient states that she experienced chest pain last tuesday which resolved spontaneously . Her chest pain returned this morning accompanied with right arm numbness/ tingling, b/l blurry vision and light headededness and she came to ed for further evaluation. C/O chest pain trop x 2 is negative and patient was initially admitted to medical team for chest pain work up.    NIHSS 2  Mental status (0-2): 0   Questions (0-2):0   Commands (0-2):0   Best Gaze (0-2):0   VF (0-3): 0   Facial weakness (0-3):0   RUE (0-4): 0   LUE (0-4): 0   RLE (0-4): 1   LLE (0-4): 0   Sensation (0-2):1   Ataxia (0-2): 0   Aphasia (0-3): 0   Dysarthria (0-2):0   Extinction (0-2): 0     mRs Score: 0    Labs and Imaging so far  []CTH N/C : negative for acute findings  []CT PERFUSION: No evidence of perfusion abnormality.  []CTA HEAD: No evidence of flow-limiting stenosis, occlusion or aneurysm.  []CTA NECK: No evidence of carotid or vertebral artery stenosis.  []MRI BRAIN N/C: Faint focus of restricted diffusion within the left ventral tawana raising concern for acute infarct.      #ASSESSMENT  -MRI Brain revealed Left ventral pontine acute infarct: S/p dapt load with asa 325mg +[id number] x1  -Chest Pain : Trop x 2 negative , cardiology consultation pending  +Smoker    PLAN:  -Transfer patient to neurology service under Dr Korey Lopez  -Continue Telemonitoring  -Neuro checks and vital signs q 4 hours  -S/p DAPT load , continue asa 81 mg + plavix 75mg q daily starting 3/11  -Lipitor 80 mg q daily  -Keep sbp 120-180  -ECHO with bubble study  -PT/REHAB evaluation and treat as needed  -Check LDL, Lipid profile, TSH  -DVT  Prophylaxis with Lovenox 40 mg sq and SCDs bilaterally  -Smoking cessation education initiated  -Case was discussed with neurology attending on call Dr Korey Lopez. Neurovascular Acceptance Note  Accepting Neurologist: Korey Lopez  Acceptance date: 3/10/25    Patient is a 46 yo right handed  female with npmhx , not on any meications or supplements at home, + smoker (3-4 cigarettes /day), family history of father with Heart disease and stents at age 61 yo, mother + smoker with ? COPD, denies any family history of stroke in young presented today for midchest pain. Patient states that she experienced chest pain last tuesday which resolved spontaneously . Her chest pain returned this morning accompanied with right arm numbness/ tingling, b/l blurry vision and light headedness and she came to ed for further evaluation. C/O chest pain trop x 2 is negative and patient was initially admitted to medical team for chest pain work up.    NIHSS 2  Mental status (0-2): 0   Questions (0-2):0   Commands (0-2):0   Best Gaze (0-2):0   VF (0-3): 0   Facial weakness (0-3):0   RUE (0-4): 0   LUE (0-4): 0   RLE (0-4): 1   LLE (0-4): 0   Sensation (0-2):1   Ataxia (0-2): 0   Aphasia (0-3): 0   Dysarthria (0-2):0   Extinction (0-2): 0     mRs Score: 0    Labs and Imaging so far  []CTH N/C : negative for acute findings  []CT PERFUSION: No evidence of perfusion abnormality.  []CTA HEAD: No evidence of flow-limiting stenosis, occlusion or aneurysm.  []CTA NECK: No evidence of carotid or vertebral artery stenosis.  []MRI BRAIN N/C: Faint focus of restricted diffusion within the left ventral tawana raising concern for acute infarct.      #ASSESSMENT  -Left ventral pontine acute infarct: S/p dapt load with asa 325mg +[id number] x1  -Chest Pain : Trop x 2 negative , cardiology consultation pending  +Smoker    PLAN:  -Transfer patient to neurology service under Dr Korey Lopez  -Continue Telemonitoring  -Neuro checks and vital signs q 4 hours  -S/p DAPT load , continue asa 81 mg + plavix 75mg q daily starting 3/11  -Lipitor 80 mg q daily  -Keep sbp 120-180  -ECHO with bubble study  -PT/REHAB evaluation and treat as needed  -Check LDL, Lipid profile, TSH  -DVT  Prophylaxis with Lovenox 40 mg sq and SCDs bilaterally  -Smoking cessation education initiated  -Case was discussed with neurology attending on call Dr Korey Lopez. Neurovascular Acceptance Note  Accepting Neurologist: Korey Lopez  Acceptance date: 3/10/25    Patient is a 46 yo right handed  female with npmhx , not on any meications or supplements at home, + smoker (3-4 cigarettes /day), family history of father with Heart disease and stents at age 59 yo, mother + smoker with ? COPD, denies any family history of stroke in young presented today for midchest pain. Patient states that she experienced chest pain last tuesday which resolved spontaneously . Her chest pain returned this morning accompanied with right arm numbness/ tingling, b/l blurry vision and light headedness and she came to ed for further evaluation. C/O chest pain trop x 2 is negative and patient was initially admitted to medical team for chest pain work up.    NIHSS 2  Mental status (0-2): 0   Questions (0-2):0   Commands (0-2):0   Best Gaze (0-2):0   VF (0-3): 0   Facial weakness (0-3):0   RUE (0-4): 0   LUE (0-4): 0   RLE (0-4): 1   LLE (0-4): 0   Sensation (0-2):1   Ataxia (0-2): 0   Aphasia (0-3): 0   Dysarthria (0-2):0   Extinction (0-2): 0     mRs Score: 0    Labs and Imaging so far  []CTH N/C : negative for acute findings  []CT PERFUSION: No evidence of perfusion abnormality.  []CTA HEAD: No evidence of flow-limiting stenosis, occlusion or aneurysm.  []CTA NECK: No evidence of carotid or vertebral artery stenosis.  []MRI BRAIN N/C: Faint focus of restricted diffusion within the left ventral tawana raising concern for acute infarct.      #ASSESSMENT  -Left ventral pontine acute infarct: S/p dapt load with asa 325mg +[id number] x1  -Chest Pain : Trop x 2 negative , cardiology consultation pending  +Smoker    PLAN:  -Admit patient to neurology service to Dr Korey Lopez  -Continue Telemonitoring  -Neuro checks and vital signs q 4 hours  -S/p DAPT load , continue asa 81 mg + plavix 75mg q daily starting 3/11  -Lipitor 80 mg q daily  -Keep sbp 120-180  -ECHO with bubble study  -PT/REHAB evaluation and treat as needed  -Check LDL, Lipid profile, TSH  -DVT  Prophylaxis with Lovenox 40 mg sq and SCDs bilaterally  -Smoking cessation education initiated  -Case was discussed with neurology attending on call Dr Korey Lopez. Neurovascular Acceptance Note  Accepting Neurologist: Korey Lopez  Acceptance date: 3/10/25    Patient is a 46 yo right handed  female with no pmhx , not on any medications or supplements at home, + smoker (3-4 cigarettes /day), family history of father with Heart disease and stents at age 61 yo, mother + smoker with ? COPD, denies any family history of stroke in young presented today for midchest pain. Patient states that she experienced chest pain last Tuesday which resolved spontaneously . Her chest pain returned this morning accompanied with right arm numbness/ tingling, b/l blurry vision and lightheadedness and she came to ed for further evaluation. C/O chest pain trop x 2 is negative and patient was initially admitted to medical team for chest pain work up.    NIHSS 2  Mental status (0-2): 0   Questions (0-2):0   Commands (0-2):0   Best Gaze (0-2):0   VF (0-3): 0   Facial weakness (0-3):0   RUE (0-4): 0   LUE (0-4): 0   RLE (0-4): 1   LLE (0-4): 0   Sensation (0-2):1   Ataxia (0-2): 0   Aphasia (0-3): 0   Dysarthria (0-2):0   Extinction (0-2): 0     mRs Score: 0    Labs and Imaging so far  []CTH N/C : negative for acute findings  []CT PERFUSION: No evidence of perfusion abnormality.  []CTA HEAD: No evidence of flow-limiting stenosis, occlusion or aneurysm.  []CTA NECK: No evidence of carotid or vertebral artery stenosis.  []MRI BRAIN N/C: Faint focus of restricted diffusion within the left ventral tawana raising concern for acute infarct.      #ASSESSMENT  -Questionable Left ventral pontine acute infarct: pending further discussion with radiology in the AM. S/p dapt load with asa 325mg +plavix 300mg x1  -Chest Pain : Trop x 2 negative , cardiology consultation pending  +Smoker    PLAN:  -Admit patient to neurology service to Dr Korey Lopez  -Continue Telemonitoring  -Neuro checks and vital signs q 4 hours  -S/p DAPT load , continue asa 81 mg + plavix 75mg q daily starting 3/11  -Lipitor 80 mg q daily  -Keep sbp 120-180  -ECHO with bubble study  -PT/REHAB evaluation and treat as needed  -Check LDL, Lipid profile, TSH  -DVT  Prophylaxis with Lovenox 40 mg sq and SCDs bilaterally  -Smoking cessation education initiated  -Case was discussed with neurology attending on call Dr Korey Lopez.

## 2025-03-10 NOTE — CONSULT NOTE ADULT - ASSESSMENT
44yo RHF w/ prior tobacco use presenting to ED for Chest pain w/ Right hemihypoesthesia face sparing suspicious for acute stroke. LKN > 12hrs, mrs 0, NIHSS 3 (right hemihypoesthesia w/ RUE and RLE hemiparesis. Possible lesion in the IC region vs CR vs pontine however, given the onset chest pain presentation is suspicious referred symptoms secondary to angina. CTH unremarkable, CTA delayed due to prior iodine allergies. Will get cardiac w/u, pending further diagnostic stroke w/u.     Recommendation  Benadryl and steroid ppx against contrast   CTA H/N after ppx  Cardiac w/u - trops, EKG, CK  Consult cardiology consult for persistent chest pain  Consider image of C-spine if negative for stroke  46yo RHF w/ prior tobacco use presenting to ED for Chest pain w/ Right hemihypoesthesia face sparing suspicious for acute stroke. LKN > 12hrs, mrs 0, NIHSS 3 (right hemihypoesthesia w/ RUE and RLE hemiparesis. Possible lesion in the IC region vs CR vs pontine however, given the onset chest pain presentation is suspicious referred symptoms secondary to angina. CTH unremarkable, CTA delayed due to prior iodine allergies. Will get cardiac w/u, pending further diagnostic stroke w/u.     Recommendation  Benadryl and steroid ppx against contrast   CTA H/N after ppx  Cardiac w/u - trops, EKG, CK  Consult cardiology consult for persistent chest pain  MRI Brain non-MANDEEP  Consider MRI C-spine if MRI negative for stroke

## 2025-03-10 NOTE — ED PROVIDER NOTE - DIFFERENTIAL DIAGNOSIS
The differential diagnosis for patients clinical presentation includes but is not limited to:  migraine, tension, meningitis (viral, bacterial), SAH, Intracranial bleed, cluster, non-specific headache, sinusitis, viral syndrome, CVA, TIA Differential Diagnosis

## 2025-03-10 NOTE — CONSULT NOTE ADULT - ATTENDING COMMENTS
Pt is a 44 yo F with PMHx of tobacco use (recently quit), who presents with chest pain radiating into RUE, right sided numbness/heaviness. Symptoms slowly evolved since last night. Pain and effort limited movement in RUE/RLE. Able to provide 4+/5 strength with repeated encouragement and distraction. decreased light touch and pin prick in R V1-2-3, RUE/RLE. CT head without acute process. CTA head/neck without flow limiting stenosis. Agree with MRI brain. Chest pain w/u as per primary team. Pt is a 46 yo F with PMHx of tobacco use (recently quit), who presents with chest pain radiating into RUE, right sided numbness/heaviness. Symptoms slowly evolved since last night. Pain and effort limited movement in RUE/RLE. Able to provide 4+/5 strength with repeated encouragement and distraction. decreased light touch and pin prick in R V1-2-3, RUE/RLE. CT head without acute process. CTA head/neck without flow limiting stenosis. Agree with MRI brain. ASA 325mg x 1. Chest pain w/u as per primary team.

## 2025-03-10 NOTE — H&P ADULT - NSHPPHYSICALEXAM_GEN_ALL_CORE
GENERAL: NAD, lying in bed comfortably  HEAD:  Atraumatic, Normocephalic  EYES: EOMI, sclera clear  ENT: Moist mucous membranes  NECK: Supple, trachea midline  CHEST/LUNG: Clear to auscultation anteriorly bilaterally; No rales, rhonchi, wheezing, or rubs. Unlabored respirations  HEART: Regular rate and rhythm; No appreciable murmurs, rubs, or gallops  ABDOMEN: (+)BS; Soft, nontender, nondistended  EXTREMITIES:  2+ Radial Pulses, brisk capillary refill. No clubbing, cyanosis, or edema  NERVOUS SYSTEM:  A&Ox3, no noted facial droop. Moving all extremities w/o difficulty.   SKIN: No rashes or lesions to b/l forearm, face.

## 2025-03-10 NOTE — ED PROVIDER NOTE - OBJECTIVE STATEMENT
45-year-old female coming with complaints of numbness and tingling since 8 AM this morning.  Reports blurry vision and right arm numbness that started at 8 AM, had preceding chest pain since yesterday evening.  Last known well was last night, woke up at 8 AM with the symptoms.  Stroke code initiated in triage.                                                                                                                                                                                45-year-old female coming with complaints of numbness and tingling since 8 AM this morning.  Reports blurry vision and right arm numbness that started at 8 AM, had preceding chest pain since yesterday evening.  Last known well was last night, woke up at 8 AM with the symptoms.  Stroke code initiated in triage.

## 2025-03-10 NOTE — ED ADULT TRIAGE NOTE - CHIEF COMPLAINT QUOTE
c/o chest pain going to back and right arm numbness since yesterday, blurry vision started today around 8 am c/o chest pain going to back since yesterday, blurry vision and right arm numbness  started today around 8 am. FS 99. LKW 8 am c/o chest pain going to back since yesterday, blurry vision and right arm numbness  started today around 8 am. FS 99. LKW 8 am, stroke code called

## 2025-03-10 NOTE — H&P ADULT - ATTENDING COMMENTS
My note supersedes the resident's note in the event of a discrepancy -    Patient seen and examined this evening  lying in bed  in nad     T(C): 36.6 (03-10-25 @ 11:18), Max: 36.6 (03-10-25 @ 11:18)  HR: 73 (03-10-25 @ 11:18) (73 - 73)  BP: 129/68 (03-10-25 @ 11:18) (129/68 - 129/68)  RR: --  SpO2: 100% (03-10-25 @ 11:18) (100% - 100%)    acetaminophen     Tablet .. 650 milliGRAM(s) Oral every 6 hours PRN  melatonin 3 milliGRAM(s) Oral at bedtime PRN    46 y/o woman w no significant PMHx presented for RUE tingling and CP, in ED had vitals wnl, trop <6, seen by neuro as stroke code and subsequently had CT Head, premedicated for contrast then CTA Head and Neck and MR brain.    #Suspected acute infarct in left ventral tawana on MRI brain  - recall neurology to review MRI findings as they signed off earlier today  - start asa/statin  - PT/OT eval  - fall precautions  - place on tele    #CP r/o ACS  - place on tele  - trend enzymes  - check echo  - cardio cs recommended by neurology     #obesity  -diet and lifestyle modification     Progress Note Handoff  Pending Consults: cardio, neuro  Pending Tests: echo  Pending Results: echo  Family Discussion: Discussed labs, meds, ct and MRI findings and overall plan of care with pt and medical staff. All questions answered. PFD for 24hrs  Disposition: Home__x___/SNF______/Other_____/Unknown at this time_____  Spent 75 min reviewing chart, speaking with patient/family and on coordinating patient care during interdisciplinary rounds

## 2025-03-10 NOTE — ED PROVIDER NOTE - CLINICAL SUMMARY MEDICAL DECISION MAKING FREE TEXT BOX
CT head shows no acute changes and patient had negative perfusion and CT angio.  Initial troponin negative.  Patient has continued weakness to her right upper extremity which may be muscular in nature, however, concern for possible acute infarct.  Antiplatelet agent given and patient to be admitted for further neurochecks and MRI.  Labs and ED workup reviewed.    ED work up reviewed and results and plan of care discussed with patient. Patient requires admission for further work up, monitoring, and management. Need for admission discussed with patient.

## 2025-03-10 NOTE — H&P ADULT - CONVERSATION DETAILS
Explained to pt that unless otherwise stated, in a hospital everyone is treated as full code, meaning if needed in an emergency, compressions are done when a pt's heart does not beat on its own and intubation and ventilation are done when a pt cannot breathe on their own. Asked whether this is something that pt would want if ever required as a life sustaining measure.   Full code    Asked whether pt has anyone who they would want to make their healthcare decisions for them if they were too confused, not awake, or otherwise unable to do so.   Daughter Porsha Cooper [phone]   Stated her parents and partner can also make decisions on her behalf but in true time-sensitive situation would trust her daughter, who is 23 y/o and lives nearby.

## 2025-03-10 NOTE — H&P ADULT - HISTORY OF PRESENT ILLNESS
46 y/o woman w no significant PMHx presented for RUE tingling and CP, in ED had vitals wnl, trop <6, seen by neuro as stroke code and subsequently had CT Head, premedicated for contrast then CTA Head and Neck and MR brain noncon which was unremarkable, suggested to f/u as outpatient for neuro but was recommended to have cardio eval, admitted for CP workup.     Triage vitals were: 129/68, HR 73bpm, 100% on RA, 97.8F   Labs notable for: trop <6,  but otherwise wnl - no elevated WBC, Cr. Electrolytes wnl.   Imaging: CXR w slight ?cardiomegaly, CT Head, CTA Head and C spine, MR brain w/o acute stroke.   Treatments: premedicated for contrast 44 y/o woman w PMHx of uterine fibroids, presented for RUE tingling and CP, in ED had vitals wnl, trop <6, seen by neuro as stroke code and subsequently had CT Head, premedicated for contrast then CTA Head and Neck and MR brain noncon which was unremarkable, suggested to f/u as outpatient for neuro but was recommended to have cardio eval, admitted for CP workup, stroke workup.     Reports 1 week ago having 30min episode of midsternal chest pressure which resolved after taking 3 tylenol. Had been tying her shoes at the time, not exerting herself at the time, and was able to have an active rest of her day going shopping w/o difficulty. Had never had this kind of chest pressure before. Night prior to arrival around 9:30pm was making dinner when she had onset of chest pressure and tingling in the R fingers, w overall severity about 6/10 and while she was not able to sleep well, did go to bed. On waking this morning was planning to go to work but her partner brought her to the ED instead for evaluation of these symptoms. Has had some improvement in symptoms since then, and was hoping to go home. Does still have positional pain/discomfort w raising her R arm. 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   Not currently menstruating, LMP ended last week.     Triage vitals were: 129/68, HR 73bpm, 100% on RA, 97.8F   Labs notable for: trop <6,  but otherwise wnl - no elevated WBC, Cr. Electrolytes wnl.   Imaging: CXR w slight ?cardiomegaly, CT Head, CTA Head and C spine, MR brain w/o acute stroke.   Treatments: premedicated for contrast

## 2025-03-10 NOTE — CONSULT NOTE ADULT - SUBJECTIVE AND OBJECTIVE BOX
Neurology Consult    Patient is a 45y old  Female who presents with a chief complaint of chest pain w/ right arm and leg numbness     HPI:      PAST MEDICAL & SURGICAL HISTORY:  No pertinent past medical history      No significant past surgical history          FAMILY HISTORY:      Social History: (-) x 3    Allergies    iodine (Unknown)  shellfish (Unknown)  penicillin (Hives; Angioedema)    Intolerances        MEDICATIONS  (STANDING):    MEDICATIONS  (PRN):      Review of systems:    Constitutional: as per HPI  Eyes: No eye pain or discharge  ENMT:  No difficulty hearing; No sinus or throat pain  Neck: No pain or stiffness  Respiratory: No cough, wheezing, chills or hemoptysis  Cardiovascular: No chest pain, palpitations, shortness of breath, dyspnea on exertion  Gastrointestinal: No abdominal pain, nausea, vomiting or hematemesis; No diarrhea or constipation.   Genitourinary: No dysuria, frequency, hematuria or incontinence  Neurological: As per HPI  Skin: No rashes or lesions   Endocrine: No heat or cold intolerance; No hair loss  Musculoskeletal: No joint pain or swelling  Psychiatric: No depression, anxiety, mood swings  Heme/Lymph: No easy bruising or bleeding gums    Vital Signs Last 24 Hrs  T(C): 36.6 (10 Mar 2025 11:18), Max: 36.6 (10 Mar 2025 11:18)  T(F): 97.8 (10 Mar 2025 11:18), Max: 97.8 (10 Mar 2025 11:18)  HR: 73 (10 Mar 2025 11:18) (73 - 73)  BP: 129/68 (10 Mar 2025 11:18) (129/68 - 129/68)  SpO2: 100% (10 Mar 2025 11:18) (100% - 100%)    Parameters below as of 10 Mar 2025 11:18  Patient On (Oxygen Delivery Method): room air        Examination:  General:  Appearance is consistent with chronologic age.  No abnormal facies.  Gross skin survey within normal limits.    Cognitive/Language:  The patient is oriented to person, place, time and date.  Recent and remote memory intact.  Fund of knowledge is intact and normal.  Language with normal repetition, comprehension and naming.  Nondysarthric.    Eyes: intact VA, VFF.  EOMI w/o nystagmus, skew or reported double vision.  PERRL.  No ptosis/weakness of eyelid closure.    Face:  Facial sensation normal V1 - 3, no facial asymmetry.    Ears/Nose/Throat:  Hearing grossly intact b/l.  Palate elevates midline.  Tongue and uvula midline.   Motor examination:   Normal tone, bulk and range of motion.  No tenderness, twitching, tremors or involuntary movements.  Formal Muscle Strength Testing: (MRC grade R/L) 5/5 UE; 5/5 LE.  No observable drift.  Reflexes:   2+ b/l pectoralis, biceps, triceps, brachioradialis, patella and Achilles.  Plantar response downgoing b/l.  Jaw jerk, Rachel, clonus absent.  Sensory examination:   Intact to light touch and pinprick, pain, temperature and proprioception and vibration in all extremities.  Cerebellum:   FTN/HKS intact with normal TAMMIE in all limbs.  No dysmetria or dysdiadokinesia.  Gait narrow based and normal.    Labs:                     Neuroimaging:         Neurology Consult    Patient is a 45y old  Female who presents with a chief complaint of chest pain w/ right arm and leg numbness     HPI:  Ms. Cooper is a 44yo F w/ recent tobacco cessation presenting to ED accompanied by her  for chest pain w/ associated stroke like symptoms. She reported feeling well 2100 last night followed by midsternal chest pressure 8/10 30mins later while going to bed. She did not take any medication. She woke up this  morning 0600 w/ persistent chest pain and tingling of the right finger tips. She continued to experienced chest discomfort, some blurry vision w/ progressed to numbness in the right arm and leg by 0800 noticed while at work. Chest pain is worst w/ movement of the right arm as well as with deep breathing. No reproduction w/ palpation. She denied nausea and vomiting. She denied difficulty speaking, visual changes, limb weakness or gait instability. She does not take medication at home, no recent fall, trauma, illness or ill contact.   She reported midsternal chest pressure 2 weeks prior which resolved after 2hrs however, no associated paresthesia or hypesthesia. She     PAST MEDICAL & SURGICAL HISTORY:  Being Left breast mass s/p lumpectomy     FAMILY HISTORY:      Social History: occasional marijuana use, tobacco cessation 3 weeks ago. 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   Genitourinary: No dysuria, frequency, hematuria or incontinence  Neurological: As per HPI  Skin: No rashes or lesions   Musculoskeletal: No joint pain or swelling      Vital Signs Last 24 Hrs  T(C): 36.6 (10 Mar 2025 11:18), Max: 36.6 (10 Mar 2025 11:18)  T(F): 97.8 (10 Mar 2025 11:18), Max: 97.8 (10 Mar 2025 11:18)  HR: 73 (10 Mar 2025 11:18) (73 - 73)  BP: 129/68 (10 Mar 2025 11:18) (129/68 - 129/68)  SpO2: 100% (10 Mar 2025 11:18) (100% - 100%)    Parameters below as of 10 Mar 2025 11:18  Patient On (Oxygen Delivery Method): room air        Examination:  General:  Appearance is consistent with chronologic age.  No abnormal facies.  Gross skin survey within normal limits.    Cognitive/Language:  The patient is oriented to person, place, time and date.  Recent and remote memory intact.  Language with normal repetition, comprehension and naming.  Nondysarthric.    Eyes: VFF.  EOMI w/o nystagmus or reported double vision.  PERRL.  No ptosis/weakness of eyelid closure.    Face:  Facial sensation normal V1 - 3, no facial asymmetry.    Ears/Nose/Throat:  Hearing grossly intact b/l.  Palate elevates midline.  Tongue and uvula midline.   Motor examination:   Normal tone, bulk and range of motion.  No tenderness, twitching, tremors or involuntary movements.  Formal Muscle Strength Testing: (MRC grade R/L) 4+/5 UE; 4+/5 LE.    Reflexes:   2+ b/l triceps, brachioradialis, patella and Achilles. clonus absent.  Sensory examination:   decrease light touch and pinprick to right face arm and leg compare to left   Cerebellum:   FTN/HKS intact with normal TAMMIE in all limbs.  No dysmetria       Labs:                         13.2   8.69  )-----------( 291      ( 10 Mar 2025 12:05 )             41.9             Neuroimaging:  < from: CT Brain Stroke Protocol (03.10.25 @ 11:37) >  No acute territorial infarct, intracranial hemorrhage, or midline shift.    < end of copied text >

## 2025-03-10 NOTE — CHART NOTE - NSCHARTNOTEFT_GEN_A_CORE
Transfer from: [facility] ED Hold    Transfer to: Neurology       44 y/o woman w PMHx of uterine fibroids, presented for RUE tingling and CP, in ED had vitals wnl, trop <6, seen by neuro as stroke code and subsequently had CT Head, premedicated for contrast then CTA Head and Neck and MR brain noncon which was unremarkable, suggested to f/u as outpatient for neuro but was recommended to have cardio eval, admitted for CP workup, stroke workup.     Reports 1 week ago having 30min episode of midsternal chest pressure which resolved after taking 3 tylenol. Had been tying her shoes at the time, not exerting herself at the time, and was able to have an active rest of her day going shopping w/o difficulty. Had never had this kind of chest pressure before. Night prior to arrival around 9:30pm was making dinner when she had onset of chest pressure and tingling in the R fingers, w overall severity about 6/10 and while she was not able to sleep well, did go to bed. On waking this morning was planning to go to work but her partner brought her to the ED instead for evaluation of these symptoms. Has had some improvement in symptoms since then, and was hoping to go home. Does still have positional pain/discomfort w raising her R arm. 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   Not currently menstruating, LMP ended last week.     Triage vitals were: 129/68, HR 73bpm, 100% on RA, 97.8F   Labs notable for: trop <6,  but otherwise wnl - no elevated WBC, Cr. Electrolytes wnl.   Imaging: CXR w slight ?cardiomegaly, CT Head, CTA Head and C spine, MR brain w/o acute stroke.   Treatments: premedicated for contrast (03-10-25 @ 19:06)        ASSESSMENT & PLAN:     44 y/o woman w no significant PMHx presented for RUE tingling and CP, in ED had vitals wnl, trop <6, seen by neuro as stroke code and subsequently had CT Head, premedicated for contrast then CTA Head and Neck and MR brain noncon which was unremarkable, suggested to f/u as outpatient for neuro but was recommended to have cardio eval, admitted for CP workup.     #CP   Unlikely STEMI equivalent/acute ischemia. Can consider pleuritis, vasospasm, ?positional thoracic outlet syndrome w intermittent compression of brachial plexus? Low likelihood for PE, not tachycardic, not hypoxic. Recent tobacco cessation.   - repeat trop  - Cardio eval as recommended by neuro   - TTE   - d-dimer  - OT consulted    #RUE paresthesias   seen by neuro as stroke code and subsequently had CT Head, premedicated for contrast   Symptoms more consistent w nerve impingement than CVA.   - CTA Head and Neck   - MR brain noncon >> PENDING NEURO DISCUSSION - neuro independent read not consistent w radiology read.                                                                              ----------------------------------------------------  # DVT prophylaxis: Lovenox 40mg QD   # GI prophylaxis: n/a   # Diet: Regular +ensure   # Activity: Ambulate as tolerated   # Code status: FULL CODE   # Disposition: Neurology

## 2025-03-10 NOTE — ED PROVIDER NOTE - ATTENDING CONTRIBUTION TO CARE
I personally evaluated patient. I agree with the findings and plan with all documentation on chart except as documented  in my note.    45-year-old female no known past medical history presents to the emergency department with numbness, tingling, heaviness to right arm and leg since this morning.  Patient had chest pain last night to her right chest is worse with moving her arm.  Patient woke up with symptoms at 8 AM and came to the emergency department.  Patient rushed to the critical care area the emergency department and stroke code was activated.  Initial NIH score was 3 for right-sided arm numbness, drift, right leg.  Vital signs were reviewed.  EKG immediately done and shows nonspecific changes inferior leads and V3 through V5.  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  Patient was immediately evaluated by neurology at bedside.  Immediate noncontrast head CT performed.  Patient return to the emergency department for pretreatment for IV contrast CT scans.  Head CT shows no acute changes.  Will follow-up lab work, bedside echo, repeat EKG, CT angio and perfusion studies.  Will follow-up workup and reassess.

## 2025-03-10 NOTE — CHART NOTE - NSCHARTNOTEFT_GEN_A_CORE
MRI brain non con reviewed and negative. No further inpatient neurological work up at this time. Can f/u with general neurology outpatient.

## 2025-03-10 NOTE — ED PROVIDER NOTE - ATTENDING WITH...
PHYSICIAN NEXT STEPS:  Review Only    CHIEF COMPLAINT:  Chief Complaint/Protocol Used: No Contact or Duplicate Contact Call (A)  Onset: Today      ASSESSMENT:  ? Onset: Today  -------------------------------------------------------    DISPOSITION:  Disposition Recommendation: Unspecified  Questions that led to disposition:  ? Caller has already spoken with another triager and has no further questions.  Patient Directed To: Unspecified  Patient Intended Action: Take care of myself at home      CALL NOTES:  09/14/2021 at 3:39 PM by Joy Nagel  ? . Duplicate call; patient has already spoken to someone and has gotten her needs met.      DISPOSITION OVERRIDE/PROVIDER CONSULT:  Disposition Override: No Disposition Reached  Override Source: Unspecified  Consulted with PCP: No  Consulted with On-Call Physician: No    CALLER CONTACT INFO:  Name: Mannie Clay Palacios (Self)  Phone 1: [phone] (Home Phone)  Phone 2: [phone] (Mobile)      ENCOUNTER STARTED:  09/14/21 03:36:59 PM  ENCOUNTER ASSIGNED TO/CLOSED BY:  Joy Nagel @ 09/14/21 03:40:38 PM      -------------------------------------------------------    UNDERSTANDS CARE ADVICE: No    AGREES WITH CARE ADVICE: No    WILL FOLLOW CARE ADVICE: No    -------------------------------------------------------  
Resident

## 2025-03-10 NOTE — ED PROVIDER NOTE - PHYSICAL EXAMINATION
VITAL SIGNS: I have reviewed nursing notes and confirm.  CONSTITUTIONAL: well-appearing, non-toxic, NAD  SKIN: Warm dry, normal skin turgor  HEAD: NCAT  EYES: EOMI, PERRLA, no scleral icterus  ENT: Moist mucous membranes, normal pharynx with no erythema or exudates  NECK: Supple; non tender. Full ROM. No cervical LAD  CARD: RRR, no murmurs, rubs or gallops  RESP: clear to ausculation b/l.  No rales, rhonchi, or wheezing.  ABD: soft, + BS, non-tender, non-distended, no rebound or guarding. No CVA tenderness  EXT: Full ROM, no bony tenderness, no pedal edema, no calf tenderness  NEURO: normal motor. normal sensory. CN II-XII intact. Cerebellar testing normal. Normal gait.  Muscle Strength Testing: (MRC grade R/L) 4+/5 UE; 4+/5 LE.    Reflexes:   2+ b/l triceps, brachioradialis, patella and Achilles. clonus absent.  Sensory examination:   decrease light touch and pinprick to right face arm and leg compare to left   Cerebellum:   FTN/HKS intact with normal TAMMIE in all limbs.  No dysmetria   PSYCH: Cooperative, appropriate.

## 2025-03-10 NOTE — STROKE CODE NOTE - NIH STROKE SCALE: 6B. MOTOR LEG, RIGHT, QM
Nutrition Follow Up Note  Patient seen for: Nutrition Follow Up     Chart reviewed, events noted.     Source: [] Patient       [x] EMR        [] RN        [] Family at bedside       [] Other:    -If unable to interview patient: [] Trach/Vent/BiPAP  [] Disoriented/confused/inappropriate to interview    Nutrition-Related Events:   - Pressors:  [] Yes    [] No   - Propofol:  [] Yes    [] No         - Rate: __mL/hr. If maintained x 24 hours, propofol will provide:     Diet Order:   Diet, NPO:   Except Medications (23)      EN Order Provides:    - Total volume:    - Kcal:       ( ___kcal/kg based on __)    - Gm Protein  ( __ g/kg based on __)    - mL free water:  Protein Modular(s) Provides:  Current Pump Rate:  EN provision: ___% EN volume goal provided in past __ days    Is current diet order appropriate/adequate? [] Yes  []  No:     PO intake :   [] >75%  Adequate    [] 50-75%  Fair       [] <50%  Poor    Nutrition-related concerns:    GI:  Last BM ___.   Bowel Regimen? [] Yes   [] No  NGT Output:  IV Fluids:  Ostomy Output:  Fistula Output:     Weights:   Daily Weight in k.5 ()    MEDICATIONS  (STANDING):  cloNIDine  insulin lispro (ADMELOG) corrective regimen sliding scale  levothyroxine  lisinopril  multiple electrolytes Injection Type 1  polyethylene glycol 3350  senna    Pertinent Labs:  @ 22:49: Na 143, BUN 8, Cr 0.40<L>, <H>, K+ 4.1, Phos 2.9, Mg 2.1, Alk Phos --, ALT/SGPT --, AST/SGOT --, HbA1c --   @ 15:11: Na 143, BUN 10, Cr 0.43<L>, <H>, K+ 3.5, Phos 2.9, Mg 2.2, Alk Phos --, ALT/SGPT --, AST/SGOT --, HbA1c --    A1C with Estimated Average Glucose Result: 5.3 % (23 @ 05:09)    Finger Sticks:  POCT Blood Glucose.: 120 mg/dL (06-15 @ 05:57)  POCT Blood Glucose.: 116 mg/dL (06-15 @ 00:18)  POCT Blood Glucose.: 98 mg/dL ( @ 17:26)  POCT Blood Glucose.: 118 mg/dL ( @ 12:27)        Skin per nursing documentation:   Edema:     Estimated Energy Needs:  Estimated Protein Needs:  Estimated Fluid Needs:   [place] Equation:    Previous Nutrition Diagnosis:   Nutrition Diagnosis is: [] ongoing  [] resolved [] not applicable     New Nutrition Diagnosis: [] Not applicable    Nutrition Care Plan:  [] In Progress  [] Achieved  [] Not applicable    Nutrition Interventions:     Education Provided:       [] Yes:  [] No:        Recommendations:         [] Continue current diet order            [] Add oral nutrition supplement:     [] Discontinue current diet order. Recommend:      [] Add micronutrient supplementation:      [] Continue current micronutrient supplementation:      [] Other:     Monitoring and Evaluation:   Continue to monitor nutritional intake, tolerance to diet prescription, weights, labs, skin integrity      RD remains available upon request and will follow up per protocol Nutrition Follow Up Note  Patient seen for: Nutrition Follow Up     Chart reviewed, events noted. Pt is a 39 yo F with PMH: HTN. Admitted  with R ICH with midline shift s/p emergent decompressive craniectomy and hematoma evacuation. CTA and angiogram  with no vascular malformation. MRI brain  without any underlying brain mass. Hospital course c/b seizures on vEEG with onset from R frontal region, now resolved. EVD in place. Extubated . On HFNC.     Source: [] Patient       [x] EMR        [x] RN        [] Family at bedside       [x] Other: interdisciplinary medical team    -If unable to interview patient: [x] Disoriented/confused/inappropriate to interview    Diet Order:   Diet, NPO:   Except Medications (23)    Pt previously prescribed Jevity 1.5 at 65ml/hr x 18 hrs with No Carb Prosource TF 1x daily (-). Provided: 1170ml, 1795kcal and 86g protein.   Now NPO s/p extubation and altered respiratory function. EN Provision:   (6/15): EN feeds held   (): EN feeds held   (): EN feeds held   (): 78% of goal  (): 67% of goal  (6/10): EN feeds held    Is current diet order appropriate/adequate? [] Yes  [x]  No: inadequate energy/protein intake based on estimated energy needs    Nutrition-related concerns:  -Pt now with inadequate energy/protein intake (<50% x >/=5 days).   -Prescribed Plasma-Lyte as prescribed, infusing at 50ml/hr.   -Per neurosurgery team, Na goal normotremia. Off hypertonic IVF at this time.   -On bowel regimen (senna, Miralax, Dulcolax). Last BM (): x 2; (): x 2.   -Continues on PO synthroid as prescribed.   -On insulin lispro sliding scale as ordered. A1c 5.3%.     Weights:   Daily Weight in k.5 ()    MEDICATIONS  (STANDING):  cloNIDine  insulin lispro (ADMELOG) corrective regimen sliding scale  levothyroxine  lisinopril  multiple electrolytes Injection Type 1  polyethylene glycol 3350  senna    Pertinent Labs:  @ 22:49: Na 143, BUN 8, Cr 0.40<L>, <H>, K+ 4.1, Phos 2.9, Mg 2.1, Alk Phos --, ALT/SGPT --, AST/SGOT --, HbA1c --   @ 15:11: Na 143, BUN 10, Cr 0.43<L>, <H>, K+ 3.5, Phos 2.9, Mg 2.2, Alk Phos --, ALT/SGPT --, AST/SGOT --, HbA1c --    A1C with Estimated Average Glucose Result: 5.3 % (23 @ 05:09)    Finger Sticks:  POCT Blood Glucose.: 120 mg/dL (06-15 @ 05:57)  POCT Blood Glucose.: 116 mg/dL (06-15 @ 00:18)  POCT Blood Glucose.: 98 mg/dL ( @ 17:26)  POCT Blood Glucose.: 118 mg/dL ( @ 12:27)        Skin per nursing documentation:   Edema:     Estimated Energy Needs:  Estimated Protein Needs:  Estimated Fluid Needs:   [place] State Equation:    Previous Nutrition Diagnosis:   Nutrition Diagnosis is: [] ongoing  [] resolved [] not applicable     New Nutrition Diagnosis: [] Not applicable    Nutrition Care Plan:  [] In Progress  [] Achieved  [] Not applicable    Nutrition Interventions:     Education Provided:       [] Yes:  [] No:        Recommendations:         [] Continue current diet order            [] Add oral nutrition supplement:     [] Discontinue current diet order. Recommend:      [] Add micronutrient supplementation:      [] Continue current micronutrient supplementation:      [] Other:     Monitoring and Evaluation:   Continue to monitor nutritional intake, tolerance to diet prescription, weights, labs, skin integrity      RD remains available upon request and will follow up per protocol Nutrition Follow Up Note  Patient seen for: Nutrition Follow Up     Chart reviewed, events noted. Pt is a 41 yo F with PMH: HTN. Admitted  with R ICH with midline shift s/p emergent decompressive craniectomy and hematoma evacuation. CTA and angiogram  with no vascular malformation. MRI brain  without any underlying brain mass. Hospital course c/b seizures on vEEG with onset from R frontal region, now resolved. EVD in place. Extubated . On HFNC.     Source: [] Patient       [x] EMR        [x] RN        [] Family at bedside       [x] Other: interdisciplinary medical team    -If unable to interview patient: [x] Disoriented/confused/inappropriate to interview    Diet Order:   Diet, NPO:   Except Medications (23)    -Pt previously prescribed Jevity 1.5 at 65ml/hr x 18 hrs with No Carb Prosource TF 1x daily (-). Provided: 1170ml, 1795kcal and 86g protein.   -Now NPO s/p extubation and altered respiratory function.     -EN Provision:   (6/15): EN feeds held   (): EN feeds held   (): EN feeds held   (): 78% of goal  (): 67% of goal  (6/10): EN feeds held    Is current diet order appropriate/adequate? [] Yes  [x]  No: inadequate energy/protein intake based on estimated energy needs    Nutrition-related concerns:  -Pt now with inadequate energy/protein intake (<75% x >/=7 days).   -Prescribed Plasma-Lyte as prescribed, infusing at 50ml/hr. Team continues to trend electrolytes and replete PRN.   -Per neurosurgery team, Na goal normotremia. Off hypertonic IVF at this time. EVD remains in place.   -On bowel regimen (senna, Miralax, Dulcolax). Last BM (): x 2; (): x 2. On Zofran PRN.   -Continues on PO synthroid as prescribed.   -On insulin lispro sliding scale as ordered. A1c 5.3%.     Weights:   Daily Weight in k.5 ()    MEDICATIONS  (STANDING):  cloNIDine  insulin lispro (ADMELOG) corrective regimen sliding scale  levothyroxine  lisinopril  multiple electrolytes Injection Type 1  polyethylene glycol 3350  senna    Pertinent Labs:  @ 22:49: Na 143, BUN 8, Cr 0.40<L>, <H>, K+ 4.1, Phos 2.9, Mg 2.1, Alk Phos --, ALT/SGPT --, AST/SGOT --, HbA1c --   @ 15:11: Na 143, BUN 10, Cr 0.43<L>, <H>, K+ 3.5, Phos 2.9, Mg 2.2, Alk Phos --, ALT/SGPT --, AST/SGOT --, HbA1c --    A1C with Estimated Average Glucose Result: 5.3 % (23 @ 05:09)    Finger Sticks:  POCT Blood Glucose.: 120 mg/dL (06-15 @ 05:57)  POCT Blood Glucose.: 116 mg/dL (06-15 @ 00:18)  POCT Blood Glucose.: 98 mg/dL ( @ 17:26)  POCT Blood Glucose.: 118 mg/dL ( @ 12:27)    Skin per nursing documentation: surgical incision R crani   Edema: 1+ generalized     (based on dosing wt 76.4kg):   Estimated Energy Needs: (28-32kcal/kg): 2139-2444kcal  Estimated Protein Needs: (1.3-1.5g protein/kg): 99-115g protein  Estimated Fluid Needs: defer to team    Previous Nutrition Diagnosis: increased nutrient needs  Nutrition Diagnosis is: [x] ongoing  [] resolved [] not applicable     New Nutrition Diagnosis: acute moderate protein calorie malnutrition   Related To: inadequate energy/protein intake with subsequent increased nutrient needs  As Evidenced By: energy intake <75% x >/=7 days; 1+ edema    Nutrition Care Plan:  [x] In Progress  [] Achieved  [] Not applicable       Recommendations:      1. Defer advancement of EN feeds to team. As able, recommend Jevity 1.5; increase to GOAL 85ml/hr x 18 hrs + No Carb Prosource TF 1x daily. Titrate as tolerated until goal rate met and tolerated. To provide (based on 76.4kg): 1530ml, 2335kcal (30.6kcak/kg) and 108g protein (1.4g protein/kg).  2. Monitor GI tolerance. RD to remain available to adjust EN formulary, volume/rate PRN.   3. Recommend multivitamin (if no medication contraindications) to aid in prevention of micronutrient deficiencies.   4. Monitor wt trends/labs/skin integrity/hydration status/bowel regularity.   5. Team to trend electrolytes and replete PRN.  6. Defer advancement to PO diet to medical team and defer consistency/texture to SLP, medical team.   7. Malnutrition sticker placed.     Monitoring and Evaluation:   Continue to monitor nutritional intake, tolerance to diet prescription, weights, labs, skin integrity    RD remains available upon request and will follow up per protocol (1) Drift; leg falls by the end of the 5-sec period but does not hit bed

## 2025-03-10 NOTE — H&P ADULT - ASSESSMENT
44 y/o woman w no significant PMHx presented for RUE tingling and CP, in ED had vitals wnl, trop <6, seen by neuro as stroke code and subsequently had CT Head, premedicated for contrast then CTA Head and Neck and MR brain noncon which was unremarkable, suggested to f/u as outpatient for neuro but was recommended to have cardio eval, admitted for CP workup.     #CP   Unlikely STEMI equivalent/acute ischemia. Can consider pleuritis, vasospasm, ?positional thoracic outlet syndrome w intermittent compression of brachial plexus? Low likelihood for PE, not tachycardic, not hypoxic. Recent tobacco cessation.   - repeat trop  - Cardio eval as recommended by neuro   - TTE   - d-dimer  - OT consulted    #RUE paresthesias   seen by neuro as stroke code and subsequently had CT Head, premedicated for contrast   - CTA Head and Neck and MR brain noncon w/o acute stroke  - suggested to f/u as outpatient for neuro                                                                               ----------------------------------------------------  # DVT prophylaxis: Lovenox 40mg QD   # GI prophylaxis: n/a   # Diet: Regular +ensure   # Activity: Ambulate as tolerated   # Code status:   # Disposition: Tele; 24-48h                                                                            --------------------------------------------------------    # Handoff 44 y/o woman w no significant PMHx presented for RUE tingling and CP, in ED had vitals wnl, trop <6, seen by neuro as stroke code and subsequently had CT Head, premedicated for contrast then CTA Head and Neck and MR brain noncon which was unremarkable, suggested to f/u as outpatient for neuro but was recommended to have cardio eval, admitted for CP workup.     #CP   Unlikely STEMI equivalent/acute ischemia. Can consider pleuritis, vasospasm, ?positional thoracic outlet syndrome w intermittent compression of brachial plexus? Low likelihood for PE, not tachycardic, not hypoxic. Recent tobacco cessation.   - repeat trop  - Cardio eval as recommended by neuro   - TTE   - d-dimer  - OT consulted    #RUE paresthesias   seen by neuro as stroke code and subsequently had CT Head, premedicated for contrast   Symptoms more consistent w nerve impingement than CVA.   - CTA Head and Neck   - MR brain noncon >> PENDING NEURO DISCUSSION - neuro independent read not consistent w radiology read. Need to f/u w neuro.                                                                               ----------------------------------------------------  # DVT prophylaxis: Lovenox 40mg QD   # GI prophylaxis: n/a   # Diet: Regular +ensure   # Activity: Ambulate as tolerated   # Code status: FULL CODE   # Disposition: Tele; 24-48h                                                                            --------------------------------------------------------

## 2025-03-11 ENCOUNTER — TRANSCRIPTION ENCOUNTER (OUTPATIENT)
Age: 45
End: 2025-03-11

## 2025-03-11 ENCOUNTER — RESULT REVIEW (OUTPATIENT)
Age: 45
End: 2025-03-11

## 2025-03-11 VITALS
DIASTOLIC BLOOD PRESSURE: 66 MMHG | HEART RATE: 64 BPM | SYSTOLIC BLOOD PRESSURE: 120 MMHG | RESPIRATION RATE: 18 BRPM | TEMPERATURE: 98 F | OXYGEN SATURATION: 99 %

## 2025-03-11 LAB
A1C WITH ESTIMATED AVERAGE GLUCOSE RESULT: 6.6 % — HIGH (ref 4–5.6)
ALBUMIN SERPL ELPH-MCNC: 3.6 G/DL — SIGNIFICANT CHANGE UP (ref 3.5–5.2)
ALP SERPL-CCNC: 133 U/L — HIGH (ref 30–115)
ALT FLD-CCNC: 14 U/L — SIGNIFICANT CHANGE UP (ref 0–41)
ANION GAP SERPL CALC-SCNC: 12 MMOL/L — SIGNIFICANT CHANGE UP (ref 7–14)
AST SERPL-CCNC: 13 U/L — SIGNIFICANT CHANGE UP (ref 0–41)
BASOPHILS # BLD AUTO: 0.2 K/UL — SIGNIFICANT CHANGE UP (ref 0–0.2)
BASOPHILS NFR BLD AUTO: 0.9 % — SIGNIFICANT CHANGE UP (ref 0–1)
BILIRUB SERPL-MCNC: <0.2 MG/DL — SIGNIFICANT CHANGE UP (ref 0.2–1.2)
BUN SERPL-MCNC: 12 MG/DL — SIGNIFICANT CHANGE UP (ref 10–20)
BURR CELLS BLD QL SMEAR: PRESENT — SIGNIFICANT CHANGE UP
CALCIUM SERPL-MCNC: 9 MG/DL — SIGNIFICANT CHANGE UP (ref 8.4–10.5)
CHLORIDE SERPL-SCNC: 103 MMOL/L — SIGNIFICANT CHANGE UP (ref 98–110)
CHOLEST SERPL-MCNC: 158 MG/DL — SIGNIFICANT CHANGE UP
CO2 SERPL-SCNC: 21 MMOL/L — SIGNIFICANT CHANGE UP (ref 17–32)
CREAT SERPL-MCNC: 0.6 MG/DL — LOW (ref 0.7–1.5)
EGFR: 113 ML/MIN/1.73M2 — SIGNIFICANT CHANGE UP
EGFR: 113 ML/MIN/1.73M2 — SIGNIFICANT CHANGE UP
EOSINOPHIL # BLD AUTO: 0 K/UL — SIGNIFICANT CHANGE UP (ref 0–0.7)
EOSINOPHIL NFR BLD AUTO: 0 % — SIGNIFICANT CHANGE UP (ref 0–8)
ESTIMATED AVERAGE GLUCOSE: 143 MG/DL — HIGH (ref 68–114)
GLUCOSE SERPL-MCNC: 161 MG/DL — HIGH (ref 70–99)
HCT VFR BLD CALC: 37.1 % — SIGNIFICANT CHANGE UP (ref 37–47)
HDLC SERPL-MCNC: 47 MG/DL — LOW
HGB BLD-MCNC: 12.1 G/DL — SIGNIFICANT CHANGE UP (ref 12–16)
LIPID PNL WITH DIRECT LDL SERPL: 88 MG/DL — SIGNIFICANT CHANGE UP
LYMPHOCYTES # BLD AUTO: 1.59 K/UL — SIGNIFICANT CHANGE UP (ref 1.2–3.4)
LYMPHOCYTES # BLD AUTO: 7.3 % — LOW (ref 20.5–51.1)
MAGNESIUM SERPL-MCNC: 1.9 MG/DL — SIGNIFICANT CHANGE UP (ref 1.8–2.4)
MANUAL SMEAR VERIFICATION: SIGNIFICANT CHANGE UP
MCHC RBC-ENTMCNC: 29.1 PG — SIGNIFICANT CHANGE UP (ref 27–31)
MCHC RBC-ENTMCNC: 32.6 G/DL — SIGNIFICANT CHANGE UP (ref 32–37)
MCV RBC AUTO: 89.2 FL — SIGNIFICANT CHANGE UP (ref 81–99)
MONOCYTES # BLD AUTO: 0.39 K/UL — SIGNIFICANT CHANGE UP (ref 0.1–0.6)
MONOCYTES NFR BLD AUTO: 1.8 % — SIGNIFICANT CHANGE UP (ref 1.7–9.3)
NEUTROPHILS # BLD AUTO: 18.59 K/UL — HIGH (ref 1.4–6.5)
NEUTROPHILS NFR BLD AUTO: 85.5 % — HIGH (ref 42.2–75.2)
NON HDL CHOLESTEROL: 111 MG/DL — SIGNIFICANT CHANGE UP
PHOSPHATE SERPL-MCNC: 2.5 MG/DL — SIGNIFICANT CHANGE UP (ref 2.1–4.9)
PLAT MORPH BLD: NORMAL — SIGNIFICANT CHANGE UP
PLATELET # BLD AUTO: 282 K/UL — SIGNIFICANT CHANGE UP (ref 130–400)
PMV BLD: 10.7 FL — HIGH (ref 7.4–10.4)
POIKILOCYTOSIS BLD QL AUTO: SLIGHT — SIGNIFICANT CHANGE UP
POTASSIUM SERPL-MCNC: 3.8 MMOL/L — SIGNIFICANT CHANGE UP (ref 3.5–5)
POTASSIUM SERPL-SCNC: 3.8 MMOL/L — SIGNIFICANT CHANGE UP (ref 3.5–5)
PROT SERPL-MCNC: 6.4 G/DL — SIGNIFICANT CHANGE UP (ref 6–8)
RBC # BLD: 4.16 M/UL — LOW (ref 4.2–5.4)
RBC # FLD: 12.6 % — SIGNIFICANT CHANGE UP (ref 11.5–14.5)
RBC BLD AUTO: ABNORMAL
SMUDGE CELLS # BLD: PRESENT — SIGNIFICANT CHANGE UP
SODIUM SERPL-SCNC: 136 MMOL/L — SIGNIFICANT CHANGE UP (ref 135–146)
T3 SERPL-MCNC: 96 NG/DL — SIGNIFICANT CHANGE UP (ref 80–200)
T4 AB SER-ACNC: 7.1 UG/DL — SIGNIFICANT CHANGE UP (ref 4.6–12)
TRIGL SERPL-MCNC: 127 MG/DL — SIGNIFICANT CHANGE UP
TROPONIN T, HIGH SENSITIVITY RESULT: <6 NG/L — SIGNIFICANT CHANGE UP (ref 6–13)
TSH SERPL-MCNC: 1.45 UIU/ML — SIGNIFICANT CHANGE UP (ref 0.27–4.2)
VARIANT LYMPHS # BLD: 4.5 % — SIGNIFICANT CHANGE UP (ref 0–5)
VARIANT LYMPHS NFR BLD MANUAL: 4.5 % — SIGNIFICANT CHANGE UP (ref 0–5)
WBC # BLD: 21.74 K/UL — HIGH (ref 4.8–10.8)
WBC # FLD AUTO: 21.74 K/UL — HIGH (ref 4.8–10.8)

## 2025-03-11 PROCEDURE — 99239 HOSP IP/OBS DSCHRG MGMT >30: CPT

## 2025-03-11 PROCEDURE — 99222 1ST HOSP IP/OBS MODERATE 55: CPT

## 2025-03-11 PROCEDURE — 93306 TTE W/DOPPLER COMPLETE: CPT | Mod: 26

## 2025-03-11 RX ORDER — INFLUENZA A VIRUS A/IDAHO/07/2018 (H1N1) ANTIGEN (MDCK CELL DERIVED, PROPIOLACTONE INACTIVATED, INFLUENZA A VIRUS A/INDIANA/08/2018 (H3N2) ANTIGEN (MDCK CELL DERIVED, PROPIOLACTONE INACTIVATED), INFLUENZA B VIRUS B/SINGAPORE/INFTT-16-0610/2016 ANTIGEN (MDCK CELL DERIVED, PROPIOLACTONE INACTIVATED), INFLUENZA B VIRUS B/IOWA/06/2017 ANTIGEN (MDCK CELL DERIVED, PROPIOLACTONE INACTIVATED) 15; 15; 15; 15 UG/.5ML; UG/.5ML; UG/.5ML; UG/.5ML
0.5 INJECTION, SUSPENSION INTRAMUSCULAR ONCE
Refills: 0 | Status: DISCONTINUED | OUTPATIENT
Start: 2025-03-11 | End: 2025-03-11

## 2025-03-11 RX ORDER — METHOCARBAMOL 500 MG/1
2 TABLET, FILM COATED ORAL
Qty: 16 | Refills: 0
Start: 2025-03-11 | End: 2025-03-12

## 2025-03-11 RX ORDER — NICOTINE POLACRILEX 4 MG/1
1 GUM, CHEWING ORAL
Qty: 2 | Refills: 0
Start: 2025-03-11 | End: 2025-04-21

## 2025-03-11 RX ADMIN — Medication 81 MILLIGRAM(S): at 12:29

## 2025-03-11 RX ADMIN — CLOPIDOGREL BISULFATE 75 MILLIGRAM(S): 75 TABLET, FILM COATED ORAL at 12:29

## 2025-03-11 NOTE — DISCHARGE NOTE PROVIDER - HOSPITAL COURSE
Hospital course:  46 yo right handed  female with npmhx , not on any medications or supplements at home, + smoker (3-4 cigarettes /day), family history of father with Heart disease and stents at age 61 yo, mother + smoker with ? COPD, denies any family history of stroke in young presented today for midchest pain. Patient states that she experienced chest pain last Tuesday which resolved spontaneously . Her chest pain returned this morning accompanied with right arm numbness/ tingling, b/l blurry vision and lightheadedness and she came to ed for further evaluation. C/O chest pain trop x 2 is negative and patient was initially admitted to medical team for chest pain work up. Transferred to neurology because initial MRI showed left ventral tawana stroke however repeat MRH showed that this was likely artifactual. Suspect chest pain multifactorial such as GERD (had reflux here) vs muscle spasm (tender to palpation, exacerbated by arm movement), and sensory changes and weakness secondary to possible radiculopathy (feels a stretching from arm and leg to the back) vs complicated migraine (patient complaining of headache, reports significant stress at work). Cleared by cardiology for discharge.    Patient had the following workup done in house:  []CTH N/C : negative for acute findings  []CT PERFUSION: No evidence of perfusion abnormality.  []CTA HEAD: No evidence of flow-limiting stenosis, occlusion or aneurysm.  []CTA NECK: No evidence of carotid or vertebral artery stenosis.  []MRI BRAIN N/C: Faint focus of restricted diffusion within the left ventral tawana raising concern for acute infarct. Review of the images demonstrates that the faint focus of restricted   diffusion and lack of T2 FLAIR hyperintensity within the left ventral tawana is equivocalin significance and may represent an artifact.  [] TTE with bubble EF 67% no PFO    Physical exam at discharge:      mRS at discharge:    Medication changes on discharge:   Labs to be followed up on after discharge:   Imaging to be done after discharge: repeat neuroimaging per stroke follow-up  Further workup after discharge: follow up stroke clinic , primary care provider ,    Hospital course:  46 yo right handed  female with npmhx , not on any medications or supplements at home, + smoker (3-4 cigarettes /day), family history of father with Heart disease and stents at age 61 yo, mother + smoker with ? COPD, denies any family history of stroke in young presented today for midchest pain. Patient states that she experienced chest pain last Tuesday which resolved spontaneously . Her chest pain returned this morning accompanied with right arm numbness/ tingling, b/l blurry vision and lightheadedness and she came to ed for further evaluation. C/O chest pain trop x 2 is negative and patient was initially admitted to medical team for chest pain work up. Transferred to neurology because initial MRI showed left ventral tawana stroke however repeat MRH showed that this was likely artifactual. Suspect chest pain multifactorial such as GERD (had reflux here) vs muscle spasm (tender to palpation, exacerbated by arm movement), and sensory changes and weakness secondary to possible radiculopathy (feels a stretching from arm and leg to the back) vs complicated migraine (patient complaining of headache, reports significant stress at work). Cleared by cardiology for discharge.    Patient had the following workup done in house:  []CTH N/C : negative for acute findings  []CT PERFUSION: No evidence of perfusion abnormality.  []CTA HEAD: No evidence of flow-limiting stenosis, occlusion or aneurysm.  []CTA NECK: No evidence of carotid or vertebral artery stenosis.  []MRI BRAIN N/C: Faint focus of restricted diffusion within the left ventral tawana raising concern for acute infarct. Review of the images demonstrates that the faint focus of restricted   diffusion and lack of T2 FLAIR hyperintensity within the left ventral tawana is equivocalin significance and may represent an artifact.  [] TTE with bubble EF 67% no PFO    Physical exam at discharge:  GENERAL: well-developed, well-nourished, in no acute distress    NEUROLOGIC:  Mental status: AOx3  Language: Speech with intact fluency , naming , repetition , comprehension, absent dysarthria  Cranial nerves:   II: Visual fields are full to confrontation  III, IV, VI: Extraocular movements intact, no noticeable nystagmus, pupils equally round and reactive to light  V:  reduced light touch R face  VII: Face is symmetric with normal eye closure and smile  XI: Head turning and shoulder shrug are intact and symmetric  XII: Tongue protrudes midline  Motor:  - shoulder abduction     R 5/5     L 5/5  - elbow flexion     R 5/5     L 5/5  - elbow extension     R 5/5     L 5/5  - hand      R 5/5     L 5/5   - hip flexion     R 5/5     L 5/5  - knee flexion     R 5/5     L 5/5  - knee extension     R 5/5     L 5/5  - dorsiflexion     R 5/5     L 5/5  - plantarflexion     R 5/5     L 5/5  - pronator drift     absent  Sensation: reduced light touch R hand  Coordination: No dysmetria on finger-to-nose and heel-to-shin bilaterally, rapid alternating movements intact and symmetric  Reflexes:   - biceps     R 2+     L 2+  - triceps     R 2+     L 2+  - brachioradialis     R 2+     L 2+  - patellar     R 2+     L 2+  - Achilles     R 2+     L 2+  - Babinski     R downgoing    L downgoing  Gait: Deferred at this encounter    mRS at discharge: 0    Medication changes on discharge: protonix , nicotine  Labs to be followed up on after discharge: n/a  Imaging to be done after discharge: neuroimaging per general neurology  Further workup after discharge: primary care provider , general neurology   Hospital course:  44 yo right handed  female with npmhx , not on any medications or supplements at home, + smoker (3-4 cigarettes /day), family history of father with Heart disease and stents at age 61 yo, mother + smoker with ? COPD, denies any family history of stroke in young presented today for midchest pain. Patient states that she experienced chest pain last Tuesday which resolved spontaneously . Her chest pain returned this morning accompanied with right arm numbness/ tingling, b/l blurry vision and lightheadedness and she came to ed for further evaluation. C/O chest pain trop x 2 is negative and patient was initially admitted to medical team for chest pain work up. Transferred to neurology because initial MRI showed left ventral tawana stroke however repeat MRH showed that this was likely artifactual. Suspect chest pain multifactorial such as GERD (had reflux here) vs muscle spasm (tender to palpation, exacerbated by arm movement), and sensory changes and weakness secondary to possible radiculopathy (feels a stretching from arm and leg to the back) vs complicated migraine (patient complaining of headache, reports significant stress at work). Cleared by cardiology for discharge.    Patient had the following workup done in house:  []CTH N/C : negative for acute findings  []CT PERFUSION: No evidence of perfusion abnormality.  []CTA HEAD: No evidence of flow-limiting stenosis, occlusion or aneurysm.  []CTA NECK: No evidence of carotid or vertebral artery stenosis.  []MRI BRAIN N/C: Faint focus of restricted diffusion within the left ventral tawana raising concern for acute infarct. Review of the images demonstrates that the faint focus of restricted   diffusion and lack of T2 FLAIR hyperintensity within the left ventral tawana is equivocalin significance and may represent an artifact.  [] TTE with bubble EF 67% no PFO    Physical exam at discharge:  GENERAL: well-developed, well-nourished, in no acute distress    NEUROLOGIC:  Mental status: AOx3  Language: Speech with intact fluency , naming , repetition , comprehension, absent dysarthria  Cranial nerves:   II: Visual fields are full to confrontation  III, IV, VI: Extraocular movements intact, no noticeable nystagmus, pupils equally round and reactive to light  V:  reduced light touch R face  VII: Face is symmetric with normal eye closure and smile  XI: Head turning and shoulder shrug are intact and symmetric  XII: Tongue protrudes midline  Motor:  - shoulder abduction     R 5/5     L 5/5  - elbow flexion     R 5/5     L 5/5  - elbow extension     R 5/5     L 5/5  - hand      R 5/5     L 5/5   - hip flexion     R 5/5     L 5/5  - knee flexion     R 5/5     L 5/5  - knee extension     R 5/5     L 5/5  - dorsiflexion     R 5/5     L 5/5  - plantarflexion     R 5/5     L 5/5  - pronator drift     absent  Sensation: reduced light touch R hand  Coordination: No dysmetria on finger-to-nose and heel-to-shin bilaterally, rapid alternating movements intact and symmetric  Reflexes:   - biceps     R 2+     L 2+  - triceps     R 2+     L 2+  - brachioradialis     R 2+     L 2+  - patellar     R 2+     L 2+  - Achilles     R 2+     L 2+  - Babinski     R downgoing    L downgoing  Gait: Deferred at this encounter    mRS at discharge: 0    Medication changes on discharge: protonix , nicotine, robaxin  Labs to be followed up on after discharge: n/a  Imaging to be done after discharge: neuroimaging per general neurology  Further workup after discharge: primary care provider , general neurology   Hospital course:  46 yo right handed  female with no pmhx , not on any medications or supplements at home, + smoker (3-4 cigarettes /day), family history of father with Heart disease and stents at age 59 yo, mother + smoker with ? COPD, denies any family history of stroke in young presented today for midchest pain. Patient states that she experienced chest pain last Tuesday which resolved spontaneously . Her chest pain returned this morning accompanied with right arm numbness/ tingling, b/l blurry vision and lightheadedness and she came to ed for further evaluation. C/O chest pain trop x 2 is negative and patient was initially admitted to medical team for chest pain work up. Transferred to neurology because initial MRI showed left ventral tawana stroke however repeat MRH showed that this was likely artifactual. Suspect chest pain multifactorial such as GERD (had reflux here) vs muscle spasm (tender to palpation, exacerbated by arm movement), and sensory changes and weakness secondary to possible radiculopathy (feels a stretching from arm and leg to the back) vs complicated migraine (patient complaining of headache, reports significant stress at work). Cleared by cardiology for discharge.    Patient had the following workup done in house:  []CTH N/C : negative for acute findings  []CT PERFUSION: No evidence of perfusion abnormality.  []CTA HEAD: No evidence of flow-limiting stenosis, occlusion or aneurysm.  []CTA NECK: No evidence of carotid or vertebral artery stenosis.  []MRI BRAIN N/C: Faint focus of restricted diffusion within the left ventral tawana raising concern for acute infarct. Review of the images demonstrates that the faint focus of restricted   diffusion and lack of T2 FLAIR hyperintensity within the left ventral tawana is equivocalin significance and may represent an artifact.  [] TTE with bubble EF 67% no PFO    Physical exam at discharge:  GENERAL: well-developed, well-nourished, in no acute distress    NEUROLOGIC:  Mental status: AOx3  Language: Speech with intact fluency , naming , repetition , comprehension, absent dysarthria  Cranial nerves:   II: Visual fields are full to confrontation  III, IV, VI: Extraocular movements intact, no noticeable nystagmus, pupils equally round and reactive to light  V:  reduced light touch R face  VII: Face is symmetric with normal eye closure and smile  XI: Head turning and shoulder shrug are intact and symmetric  XII: Tongue protrudes midline  Motor:  - shoulder abduction     R 5/5     L 5/5  - elbow flexion     R 5/5     L 5/5  - elbow extension     R 5/5     L 5/5  - hand      R 5/5     L 5/5   - hip flexion     R 5/5     L 5/5  - knee flexion     R 5/5     L 5/5  - knee extension     R 5/5     L 5/5  - dorsiflexion     R 5/5     L 5/5  - plantarflexion     R 5/5     L 5/5  - pronator drift     absent  Sensation: reduced light touch R hand  Coordination: No dysmetria on finger-to-nose and heel-to-shin bilaterally, rapid alternating movements intact and symmetric  Reflexes:   - biceps     R 2+     L 2+  - triceps     R 2+     L 2+  - brachioradialis     R 2+     L 2+  - patellar     R 2+     L 2+  - Achilles     R 2+     L 2+  - Babinski     R downgoing    L downgoing  Gait: Deferred at this encounter    mRS at discharge: 0    Medication changes on discharge: protonix , nicotine, robaxin  Labs to be followed up on after discharge: n/a  Imaging to be done after discharge: neuroimaging per general neurology  Further workup after discharge: primary care provider , general neurology      Stroke attending attestation:  Pt is a 46 yo F with PMHx of tobacco use (recently quit), who presents with chest pain radiating into RUE, right sided numbness/heaviness. Symptoms slowly evolved since last night. Pain and effort limited movement in RUE (feels pulling/tightness across her chest when she lifts her RUE more than 45 degrees). decreased light touch in right face/UE, improved in RLE.     CT head without acute process. CTA head/neck without flow limiting stenosis.   Initial MRI brain reported as possible left ventral pontine acute ischemia. reviewed with the Radiologist, Dr. Nichols, as it appeared more artifactual on my assessment (equivocal restricted diffusion without [id number] changes). And wouldn't align with her hx/exam, with predominately subjective sensory loss within 24 hrs and pain limited RUE movement. Imaging then reviewed with Neuroradiologist, Dr. Campa, who agreed that the suspected pontine lesion was in fact artifactual. Repeated limited MRI with DWI only, and the previously seen spot in the tawana is no longer visible.     Difficulty lifting RUE is likely 2/2 musculoskeletal pain in chest. Appreciated cardiology recs, no indication for ischemic w/u.   RUE hypoesthesia possible 2/2 cervical radiculopathy.  May d/c antiplatelets.   D/c home with outpt general neurology f/u.

## 2025-03-11 NOTE — DISCHARGE NOTE PROVIDER - CARE PROVIDERS DIRECT ADDRESSES
[email or web address]

## 2025-03-11 NOTE — DISCHARGE NOTE NURSING/CASE MANAGEMENT/SOCIAL WORK - FINANCIAL ASSISTANCE
[facility] provides services at a reduced cost to those who are determined to be eligible through [facility]’s financial assistance program. Information regarding [facility]’s financial assistance program can be found by going to [email or web address] or by calling [phone].

## 2025-03-11 NOTE — DISCHARGE NOTE PROVIDER - NSDCCPCAREPLAN_GEN_ALL_CORE_FT
PRINCIPAL DISCHARGE DIAGNOSIS  Diagnosis: Weakness  Assessment and Plan of Treatment: You were admitted for evaluation and management of: chest pain, right sided weakness  You were evaluated with: CT, MRI, EKG  You were treated with: blood pressure optimization , pain control  On discharge please do the following:   - take all medications as prescribed  - follow up with a primary care provider  - follow up general neurology if weakness persists  Please return to the ED if you feel unwell, or if you experience worsening symptoms, or any of the following symptoms but not limited to: chest pain, palpitations, shortness of breath, falls, confusion, seizures, sudden balance problems, double vision, vision problems, facial droop, arm or leg weakness, slurring, bleeding, severe pain

## 2025-03-11 NOTE — CONSULT NOTE ADULT - ASSESSMENT
44 y/o woman w PMHx of uterine fibroids, presented for RUE tingling and CP. Cardiology consulted for CP evaluation   Patient reports she has been having intermittent right sided chest pain since the last week at rest triggered with positional changes and alleviated with acetaminophen coupled with right arm hemiparesis ( being evaluated by neurology)   At baseline she is very functional with METS > 4     On exam, he patient is positional and reproducible to palpation indication of a musculoskeletal origin.   Non cardiac chest pain-    # Non cardiac chest pain   # MSK chest pain   # Uterine fibroids     PLAN   - No indication for further ischemic workup   - NSAIDs for MSK chest pain.  44 y/o woman w PMHx of uterine fibroids, presented for RUE tingling and CP.    Cardiology consulted for CP evaluation.  Patient reports she has been having intermittent right sided chest pain since the last week at rest triggered with positional changes and alleviated with acetaminophen coupled with right arm hemiparesis (being evaluated by neurology)     At baseline she is very functional with METS > 4     On exam, he patient is positional and reproducible to palpation indication of a musculoskeletal origin.       # Non cardiac chest pain   # MSK chest pain       PLAN   - No indication for ischemic workup   - NSAIDs for MSK chest pain 44 y/o woman w PMHx of uterine fibroids, presented for RUE tingling and CP.    Cardiology consulted for CP evaluation.  Patient reports she has been having intermittent right sided chest pain since the last week at rest triggered with positional changes and alleviated with acetaminophen coupled with right arm hemiparesis (being evaluated by neurology)     At baseline she is very functional with METS > 4     On exam, her chest pain is positional and reproducible to palpation indicative of a musculoskeletal origin.      # Non cardiac chest pain   # MSK chest pain       PLAN   - No indication for ischemic workup   - NSAIDs for MSK chest pain

## 2025-03-11 NOTE — PHYSICAL THERAPY INITIAL EVALUATION ADULT - PLANNED THERAPY INTERVENTIONS, PT EVAL
No PT intervention needed. Patient independent in ed mobility, transfers and ambulation no AD. Contact PT if status changes.

## 2025-03-11 NOTE — PATIENT PROFILE ADULT - NSPROEXTENSIONSOFSELF_GEN_A_NUR
Patient: Tino Frias Date: 2018   : 1973 Attending: Andrés Foss DO   44 year old male      CC: Urinary retention, UTI    Subjective/Interval History: He feels well this morning. Catheter in place.     Objective: Reviewed Pertinent: Vitals and labs.     Blood pressure 144/80, pulse 82, temperature 97.4 °F (36.3 °C), temperature source Oral, resp. rate 18, height 6' 1\" (1.854 m), weight 135 kg, SpO2 95 %.    Physical Exam:      General: No Acute Distress, well-nourished   Psych/Neuro: Pleasant and appropriate affect, no agitation, Alert and Oriented   HEENT: Normal Cephalic Atraumatic   Pulmonary: No Dyspnea or Cough, no labored breathing, no accessory muscle use   Abdomen: Soft, Not Tender, Not Distended  Extremities: No Clubbing or Edema  : catheter in place with clear yellow urine in tube     Renal US:  1.  Normal bilateral renal scan.  2.  5 cm region of polypoid masslike thickening in the posterior wall of  the bladder with internal color Doppler flow.  Finding is suspicious for  bladder wall neoplasm.  Recommend cystoscopic evaluation and biopsy as  clinically appropriate.    ASSESSMENT:  · Neurogenic bladder, currently straight cath three times daily at home  · Multiple sclerosis  · Urinary tract infection  · Bladder abnormality seen on ultrasound     PLAN:  · Increase frequency of straight cath to 4 times daily  · Follow urine culture - growing enterococcus with sensitivities pending  · Will need cystoscopy to evaluate bladder abnormality seen on ultrasound - recommend this be done as an outpatient either with Dr. Lee or Dr. Yin once his infection has cleared. Discussed with patient.    Discussed with:   Patient, [facility]  Phone: [phone]  Pager: [phone]    Please page urology [facility] with questions on weekdays between the hours of 9:00 am and 4:00 pm. Please page urologist with questions on nights and weekends.      ** 
If you have paged me and have not had page returned in 15 minutes- Please page [phone]**     STAFF ADDENDUM:   7/30/2018 12:01 PM    I have examined the patient, reviewed the pertinent diagnostic studies and medical data, and have participated in the development of the treatment plan with Martha HINES.    Plan:  1. Enterococcus UTI, culture sensitivities are pending. Patient will need have a diagnostic cystoscopy to evaluate his bladder. This will need to be done as an outpatient since we do not have sensitivities back on his UTI. Once infection is treated obviously needs have a diagnostic cystoscopy given his ultrasound.  Discussed with patient.  -Follow-up 7-10 days for diagnostic cystoscopy as outpatient.    Please call if there are any questions or need to discuss further.  Thank you.    Alcides Yin MD  Office: [phone]  Pager: [phone]        
none
no abrasions, no jaundice, no lesions, no pruritis, and no rashes.

## 2025-03-11 NOTE — CONSULT NOTE ADULT - SUBJECTIVE AND OBJECTIVE BOX
HPI:  46 y/o woman w PMHx of uterine fibroids, presented for RUE tingling and CP, in ED had vitals wnl, trop <6, seen by neuro as stroke code and subsequently had CT Head, premedicated for contrast then CTA Head and Neck and MR brain noncon which was unremarkable, suggested to f/u as outpatient for neuro but was recommended to have cardio eval, admitted for CP workup, stroke workup.     Reports 1 week ago having 30min episode of midsternal chest pressure which resolved after taking 3 tylenol. Had been tying her shoes at the time, not exerting herself at the time, and was able to have an active rest of her day going shopping w/o difficulty. Had never had this kind of chest pressure before. Night prior to arrival around 9:30pm was making dinner when she had onset of chest pressure and tingling in the R fingers, w overall severity about 6/10 and while she was not able to sleep well, did go to bed. On waking this morning was planning to go to work but her partner brought her to the ED instead for evaluation of these symptoms. Has had some improvement in symptoms since then, and was hoping to go home. Does still have positional pain/discomfort w raising her R arm. 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   Not currently menstruating, LMP ended last week.     Triage vitals were: 129/68, HR 73bpm, 100% on RA, 97.8F   Labs notable for: trop <6,  but otherwise wnl - no elevated WBC, Cr. Electrolytes wnl.   Imaging: CXR w slight ?cardiomegaly, CT Head, CTA Head and C spine, MR brain w/o acute stroke.   Treatments: premedicated for contrast     < from: MR Head No Cont (03.10.25 @ 17:51) >  IMPRESSION:  Faint focus of restricted diffusion within the left ventral tawana raising   concern for acute infarct.    < end of copied text >        PAST MEDICAL & SURGICAL HISTORY:  Fibroids      No significant past surgical history          Hospital Course:    TODAY'S SUBJECTIVE & REVIEW OF SYMPTOMS:     Constitutional WNL   Cardio WNL   Resp WNL   GI WNL  Heme WNL  Endo WNL  Skin WNL  MSK WNL  Neuro right side weakness and numbness / vision change   Cognitive WNL  Psych WNL      MEDICATIONS  (STANDING):  aspirin  chewable 81 milliGRAM(s) Oral daily  atorvastatin 80 milliGRAM(s) Oral at bedtime  clopidogrel Tablet 75 milliGRAM(s) Oral daily  enoxaparin Injectable 40 milliGRAM(s) SubCutaneous every 24 hours  influenza   Vaccine 0.5 milliLiter(s) IntraMuscular once    MEDICATIONS  (PRN):  acetaminophen     Tablet .. 650 milliGRAM(s) Oral every 6 hours PRN Temp greater or equal to 38C (100.4F), Mild Pain (1 - 3)  melatonin 3 milliGRAM(s) Oral at bedtime PRN Insomnia      FAMILY HISTORY:      Allergies    iodine (Unknown)  shellfish (Unknown)  penicillin (Hives; Angioedema)    Intolerances        SOCIAL HISTORY:    [  ] Etoh  [  ] Smoking  [  ] Substance abuse     Home Environment:  [   ] Home Alone  [ x  ] Lives with Family  [   ] Home Health Aid    Dwelling:  [ x  ] Apartment  [   ] Private House  [   ] Adult Home  [   ] Skilled Nursing Facility      [   ] Short Term  [   ] Long Term  [   ] Stairs       Elevator [ x  ]    FUNCTIONAL STATUS PTA: (Check all that apply)  Ambulation: [  x  ]Independent    [   ] Dependent     [   ] Non-Ambulatory  Assistive Device: [   ] SA Cane  [   ]  Q Cane  [   ] Walker  [   ]  Wheelchair  ADL : [ x  ] Independent  [    ]  Dependent       Vital Signs Last 24 Hrs  T(C): 36.3 (11 Mar 2025 12:00), Max: 36.6 (11 Mar 2025 00:16)  T(F): 97.3 (11 Mar 2025 12:00), Max: 97.9 (11 Mar 2025 00:16)  HR: 72 (11 Mar 2025 12:00) (67 - 73)  BP: 112/70 (11 Mar 2025 12:00) (112/70 - 140/74)  BP(mean): 94 (11 Mar 2025 12:00) (94 - 99)  RR: 18 (11 Mar 2025 12:00) (18 - 18)  SpO2: 99% (11 Mar 2025 12:00) (97% - 99%)    Parameters below as of 11 Mar 2025 06:06  Patient On (Oxygen Delivery Method): room air          PHYSICAL EXAM: Awake & Alert  GENERAL: NAD  HEAD:  Normocephalic  CHEST/LUNG: Clear   HEART: S1S2+  ABDOMEN: Soft, Nontender  EXTREMITIES:  no calf tenderness    NERVOUS SYSTEM:  Cranial Nerves 2-12 intact [  x ] Abnormal  [   ]  ROM: WFL all extremities [  x ]  Abnormal [   ]  Motor Strength: WFL all extremities  [   ]  Abnormal [ x  ]4/5 right side, 5/5 left side   Sensation: intact to light touch [   ] Abnormal [ x  ]decreased light touch right side     FUNCTIONAL STATUS:  Bed Mobility: Independent [   ]  Supervision [   ]  Needs Assistance [ x  ]  N/A [   ]  Transfers: Independent [   ]  Supervision [   ]  Needs Assistance [   ]  N/A [   ]   Ambulation: Independent [   ]  Supervision [   ]  Needs Assistance [   ]  N/A [   ]  ADL: Independent [   ] Requires Assistance [   ] N/A [   ]      LABS:                        12.1   21.74 )-----------( 282      ( 11 Mar 2025 10:10 )             37.1     03-11    136  |  103  |  12  ----------------------------<  161[H]  3.8   |  21  |  0.6[L]    Ca    9.0      11 Mar 2025 10:10  Phos  2.5     03-11  Mg     1.9     03-11    TPro  6.4  /  Alb  3.6  /  TBili  <0.2  /  DBili  x   /  AST  13  /  ALT  14  /  AlkPhos  133[H]  03-11    PT/INR - ( 10 Mar 2025 12:05 )   PT: 10.70 sec;   INR: 0.91 ratio         PTT - ( 10 Mar 2025 12:05 )  PTT:33.2 sec  Urinalysis Basic - ( 11 Mar 2025 10:10 )    Color: x / Appearance: x / SG: x / pH: x  Gluc: 161 mg/dL / Ketone: x  / Bili: x / Urobili: x   Blood: x / Protein: x / Nitrite: x   Leuk Esterase: x / RBC: x / WBC x   Sq Epi: x / Non Sq Epi: x / Bacteria: x        RADIOLOGY & ADDITIONAL STUDIES:

## 2025-03-11 NOTE — DISCHARGE NOTE PROVIDER - PROVIDER TOKENS
PROVIDER:[TOKEN:[[id number]],FOLLOWUP:[2 weeks]],PROVIDER:[TOKEN:[[id number]],FOLLOWUP:[2 weeks]]

## 2025-03-11 NOTE — ED ADULT NURSE NOTE - CHIEF COMPLAINT QUOTE
c/o chest pain going to back since yesterday, blurry vision and right arm numbness  started today around 8 am. FS 99. LKW 8 am, stroke code called

## 2025-03-11 NOTE — PHYSICAL THERAPY INITIAL EVALUATION ADULT - PERTINENT HX OF CURRENT PROBLEM, REHAB EVAL
44 y/o woman w PMHx of uterine fibroids, presented for RUE tingling and CP, in ED had vitals wnl, trop <6, seen by neuro as stroke code and subsequently had CT Head, premedicated for contrast then CTA Head and Neck and MR brain noncon which was unremarkable, suggested to f/u as outpatient for neuro but was recommended to have cardio eval, admitted for CP workup, stroke workup.     Reports 1 week ago having 30min episode of midsternal chest pressure which resolved after taking 3 tylenol. Had been tying her shoes at the time, not exerting herself at the time, and was able to have an active rest of her day going shopping w/o difficulty. Had never had this kind of chest pressure before. Night prior to arrival around 9:30pm was making dinner when she had onset of chest pressure and tingling in the R fingers, w overall severity about 6/10 and while she was not able to sleep well, did go to bed. On waking this morning was planning to go to work but her partner brought her to the ED instead for evaluation of these symptoms. Has had some improvement in symptoms since then, and was hoping to go home. Does still have positional pain/discomfort w raising her R arm. 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   Not currently menstruating, LMP ended last week.

## 2025-03-11 NOTE — PROGRESS NOTE ADULT - ATTENDING COMMENTS
Pt is a 46 yo F with PMHx of tobacco use (recently quit), who presents with chest pain radiating into RUE, right sided numbness/heaviness. Symptoms slowly evolved since last night. Pain and effort limited movement in RUE (feels pulling/tightness across her chest when she lifts her RUE more than 45 degrees). decreased light touch in right face/UE, improved in RLE.     CT head without acute process. CTA head/neck without flow limiting stenosis.   Initial MRI brain reported as possible left ventral pontine acute ischemia. reviewed with the Radiologist, Dr. Nichols, as it appeared more artifactual on my assessment (equivocal restricted diffusion without [id number] changes). And wouldn't align with her hx/exam, with predominately subjective sensory loss within 24 hrs and pain limited RUE movement. Imaging then reviewed with Neuroradiologist, Dr. Campa, who agreed that the suspected pontine lesion was in fact artifactual. Repeated limited MRI with DWI only, and the previously seen spot in the tawana is no longer visible.     Difficulty lifting RUE is likely 2/2 musculoskeletal pain in chest. Appreciated cardiology recs, no indication for ischemic w/u.   RUE hypoesthesia possible 2/2 cervical radiculopathy.  May d/c antiplatelets.   D/c home with outpt general neurology f/u.

## 2025-03-11 NOTE — DISCHARGE NOTE PROVIDER - CARE PROVIDER_API CALL
Ko Jack  Neurology  [address] [place] [address], [address]  [place] [address]  Phone: [phone]  Fax: [phone]  Follow Up Time: 2 weeks    Mark Drew  Surgery  N  SHAMAR COBURN, Phys,    Phone: ()-  Fax: ()-  Follow Up Time: 2 weeks

## 2025-03-11 NOTE — PROGRESS NOTE ADULT - SUBJECTIVE AND OBJECTIVE BOX
Neurology Progress Note    Interval History:     Medications:  acetaminophen     Tablet .. 650 milliGRAM(s) Oral every 6 hours PRN  aspirin  chewable 81 milliGRAM(s) Oral daily  atorvastatin 80 milliGRAM(s) Oral at bedtime  clopidogrel Tablet 75 milliGRAM(s) Oral daily  enoxaparin Injectable 40 milliGRAM(s) SubCutaneous every 24 hours  influenza   Vaccine 0.5 milliLiter(s) IntraMuscular once  melatonin 3 milliGRAM(s) Oral at bedtime PRN    Vital Signs Last 24 Hrs  T(C): 36.3 (11 Mar 2025 06:06), Max: 36.6 (10 Mar 2025 11:18)  T(F): 97.4 (11 Mar 2025 06:06), Max: 97.9 (11 Mar 2025 00:16)  HR: 69 (11 Mar 2025 06:06) (67 - 73)  BP: 137/74 (11 Mar 2025 06:06) (129/68 - 140/74)  BP(mean): 99 (11 Mar 2025 06:06) (96 - 99)  RR: 18 (11 Mar 2025 06:06) (18 - 18)  SpO2: 99% (11 Mar 2025 06:06) (97% - 100%)    Parameters below as of 11 Mar 2025 06:06  Patient On (Oxygen Delivery Method): room air      Physical Exam  GENERAL: well-developed, well-nourished, in no acute distress    NEUROLOGIC:  Mental status: AOx  Language: Speech with intact fluency , naming , repetition , comprehension, absent dysarthria  Cranial nerves:   II: Visual fields are full to confrontation  III, IV, VI: Extraocular movements intact, no noticeable nystagmus, pupils equally round and reactive to light  V:  Facial sensation V1-V3 equal and intact   VII: Face is symmetric with normal eye closure and smile  VIII: Hearing is intact bilaterally intact  IX, X: Uvula is midline and soft palate rises symmetrically  XI: Head turning and shoulder shrug are intact and symmetric  XII: Tongue protrudes midline  Motor:  - shoulder abduction     R 5/5     L 5/5  - elbow flexion     R 5/5     L 5/5  - elbow extension     R 5/5     L 5/5  - hand      R 5/5     L 5/5   - wrist flexion     R 5/5     L 5/5  - wrist extension     R 5/5     L 5/5  - finger flexion     R 5/5     L 5/5  - finger extension     R 5/5     L 5/5  - finger abduction     R 5/5     L 5/5  - hip flexion     R 5/5     L 5/5  - knee flexion     R 5/5     L 5/5  - knee extension     R 5/5     L 5/5  - dorsiflexion     R 5/5     L 5/5  - plantarflexion     R 5/5     L 5/5  - pronator drift     absent / present L / R  Sensation: Intact to light touch bilaterally. No neglect or extinction on double simultaneous testing.  Coordination: No dysmetria on finger-to-nose and heel-to-shin bilaterally, rapid alternating movements intact and symmetric  Reflexes:   - biceps     R 2+     L 2+  - triceps     R 2+     L 2+  - brachioradialis     R 2+     L 2+  - patellar     R 2+     L 2+  - Achilles     R 2+     L 2+  - Babinski     R downgoing / upgoing    L downgoing / upgoing  - Mariano     R absent / present     L absent / present  Gait: Narrow gait and steady, Romberg sign negative / Deferred at this encounter    NIHSS:   - Level of consciousness:   - Ask month and age:   - 'Blink eyes' & 'squeeze hands':   - Horizontal extraocular movements:   - Visual fields:   - Facial palsy:   - Left arm motor drift:   - Right arm motor drift:   - Left leg motor drift:   - Right leg motor drift:   - Limb Ataxia:   - Sensation:   - Language/aphasia:   - Dysarthria:   - Extinction/inattention:     Labs:  CBC Full  -  ( 10 Mar 2025 12:05 )  WBC Count : 8.69 K/uL  RBC Count : 4.61 M/uL  Hemoglobin : 13.2 g/dL  Hematocrit : 41.9 %  Platelet Count - Automated : 291 K/uL  Mean Cell Volume : 90.9 fL  Mean Cell Hemoglobin : 28.6 pg  Mean Cell Hemoglobin Concentration : 31.5 g/dL  Auto Neutrophil # : x  Auto Lymphocyte # : x  Auto Monocyte # : x  Auto Eosinophil # : x  Auto Basophil # : x  Auto Neutrophil % : x  Auto Lymphocyte % : x  Auto Monocyte % : x  Auto Eosinophil % : x  Auto Basophil % : x    03-10    135  |  101  |  10  ----------------------------<  105[H]  4.5   |  24  |  0.7    Ca    8.8      10 Mar 2025 12:05    TPro  6.5  /  Alb  4.0  /  TBili  <0.2  /  DBili  x   /  AST  18  /  ALT  17  /  AlkPhos  138[H]  03-10    LIVER FUNCTIONS - ( 10 Mar 2025 12:05 )  Alb: 4.0 g/dL / Pro: 6.5 g/dL / ALK PHOS: 138 U/L / ALT: 17 U/L / AST: 18 U/L / GGT: x           PT/INR - ( 10 Mar 2025 12:05 )   PT: 10.70 sec;   INR: 0.91 ratio         PTT - ( 10 Mar 2025 12:05 )  PTT:33.2 sec  Urinalysis Basic - ( 10 Mar 2025 12:05 )    Color: x / Appearance: x / SG: x / pH: x  Gluc: 105 mg/dL / Ketone: x  / Bili: x / Urobili: x   Blood: x / Protein: x / Nitrite: x   Leuk Esterase: x / RBC: x / WBC x   Sq Epi: x / Non Sq Epi: x / Bacteria: x     Neurology Progress Note    Interval History: patient reported doing well, weakness improving    Medications:  acetaminophen     Tablet .. 650 milliGRAM(s) Oral every 6 hours PRN  aspirin  chewable 81 milliGRAM(s) Oral daily  atorvastatin 80 milliGRAM(s) Oral at bedtime  clopidogrel Tablet 75 milliGRAM(s) Oral daily  enoxaparin Injectable 40 milliGRAM(s) SubCutaneous every 24 hours  influenza   Vaccine 0.5 milliLiter(s) IntraMuscular once  melatonin 3 milliGRAM(s) Oral at bedtime PRN    Vital Signs Last 24 Hrs  T(C): 36.3 (11 Mar 2025 06:06), Max: 36.6 (10 Mar 2025 11:18)  T(F): 97.4 (11 Mar 2025 06:06), Max: 97.9 (11 Mar 2025 00:16)  HR: 69 (11 Mar 2025 06:06) (67 - 73)  BP: 137/74 (11 Mar 2025 06:06) (129/68 - 140/74)  BP(mean): 99 (11 Mar 2025 06:06) (96 - 99)  RR: 18 (11 Mar 2025 06:06) (18 - 18)  SpO2: 99% (11 Mar 2025 06:06) (97% - 100%)    Parameters below as of 11 Mar 2025 06:06  Patient On (Oxygen Delivery Method): room air      Physical Exam  GENERAL: well-developed, well-nourished, in no acute distress    NEUROLOGIC:  Mental status: AOx3  Language: Speech with intact fluency , naming , repetition , comprehension, absent dysarthria  Cranial nerves:   II: Visual fields are full to confrontation  III, IV, VI: Extraocular movements intact, no noticeable nystagmus, pupils equally round and reactive to light  V:  reduced light touch R face  VII: Face is symmetric with normal eye closure and smile  XI: Head turning and shoulder shrug are intact and symmetric  XII: Tongue protrudes midline  Motor:  - shoulder abduction     R 5/5     L 5/5  - elbow flexion     R 5/5     L 5/5  - elbow extension     R 5/5     L 5/5  - hand      R 5/5     L 5/5   - hip flexion     R 5/5     L 5/5  - knee flexion     R 5/5     L 5/5  - knee extension     R 5/5     L 5/5  - dorsiflexion     R 5/5     L 5/5  - plantarflexion     R 5/5     L 5/5  - pronator drift     absent  Sensation: reduced light touch R hand  Coordination: No dysmetria on finger-to-nose and heel-to-shin bilaterally, rapid alternating movements intact and symmetric  Reflexes:   - biceps     R 2+     L 2+  - triceps     R 2+     L 2+  - brachioradialis     R 2+     L 2+  - patellar     R 2+     L 2+  - Achilles     R 2+     L 2+  - Babinski     R downgoing    L downgoing  Gait: Deferred at this encounter    Labs:  CBC Full  -  ( 10 Mar 2025 12:05 )  WBC Count : 8.69 K/uL  RBC Count : 4.61 M/uL  Hemoglobin : 13.2 g/dL  Hematocrit : 41.9 %  Platelet Count - Automated : 291 K/uL  Mean Cell Volume : 90.9 fL  Mean Cell Hemoglobin : 28.6 pg  Mean Cell Hemoglobin Concentration : 31.5 g/dL  Auto Neutrophil # : x  Auto Lymphocyte # : x  Auto Monocyte # : x  Auto Eosinophil # : x  Auto Basophil # : x  Auto Neutrophil % : x  Auto Lymphocyte % : x  Auto Monocyte % : x  Auto Eosinophil % : x  Auto Basophil % : x    03-10    135  |  101  |  10  ----------------------------<  105[H]  4.5   |  24  |  0.7    Ca    8.8      10 Mar 2025 12:05    TPro  6.5  /  Alb  4.0  /  TBili  <0.2  /  DBili  x   /  AST  18  /  ALT  17  /  AlkPhos  138[H]  03-10    LIVER FUNCTIONS - ( 10 Mar 2025 12:05 )  Alb: 4.0 g/dL / Pro: 6.5 g/dL / ALK PHOS: 138 U/L / ALT: 17 U/L / AST: 18 U/L / GGT: x           PT/INR - ( 10 Mar 2025 12:05 )   PT: 10.70 sec;   INR: 0.91 ratio         PTT - ( 10 Mar 2025 12:05 )  PTT:33.2 sec  Urinalysis Basic - ( 10 Mar 2025 12:05 )    Color: x / Appearance: x / SG: x / pH: x  Gluc: 105 mg/dL / Ketone: x  / Bili: x / Urobili: x   Blood: x / Protein: x / Nitrite: x   Leuk Esterase: x / RBC: x / WBC x   Sq Epi: x / Non Sq Epi: x / Bacteria: x

## 2025-03-11 NOTE — CONSULT NOTE ADULT - SUBJECTIVE AND OBJECTIVE BOX
Outpt cardiologist:    HPI:  46 y/o woman w PMHx of uterine fibroids, presented for RUE tingling and CP, in ED had vitals wnl, trop <6, seen by neuro as stroke code and subsequently had CT Head, premedicated for contrast then CTA Head and Neck and MR brain noncon which was unremarkable, suggested to f/u as outpatient for neuro but was recommended to have cardio eval, admitted for CP workup, stroke workup.     Reports 1 week ago having 30min episode of midsternal chest pressure which resolved after taking 3 tylenol. Had been tying her shoes at the time, not exerting herself at the time, and was able to have an active rest of her day going shopping w/o difficulty. Had never had this kind of chest pressure before. Night prior to arrival around 9:30pm was making dinner when she had onset of chest pressure and tingling in the R fingers, w overall severity about 6/10 and while she was not able to sleep well, did go to bed. On waking this morning was planning to go to work but her partner brought her to the ED instead for evaluation of these symptoms. Has had some improvement in symptoms since then, and was hoping to go home. Does still have positional pain/discomfort w raising her R arm. 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   Not currently menstruating, LMP ended last week.     Triage vitals were: 129/68, HR 73bpm, 100% on RA, 97.8F   Labs notable for: trop <6,  but otherwise wnl - no elevated WBC, Cr. Electrolytes wnl.   Imaging: CXR w slight ?cardiomegaly, CT Head, CTA Head and C spine, MR brain w/o acute stroke.   Treatments: premedicated for contrast (10 Mar 2025 19:06)      ---  Cardiac Fellow Additional notes:    46 y/o woman w PMHx of uterine fibroids, presented for RUE tingling and CP.  Patient reports she has been having intermittent right sided chest pain since the last week at rest triggered with positional changes and alleviated with acetaminophen.   At baseline she is very functional with METS > 4         PAST MEDICAL & SURGICAL HISTORY  Fibroids    No significant past surgical history        FAMILY HISTORY:  FAMILY HISTORY:      SOCIAL HISTORY:  Social History:      ALLERGIES:  iodine (Unknown)  shellfish (Unknown)  penicillin (Hives; Angioedema)      MEDICATIONS:  aspirin  chewable 81 milliGRAM(s) Oral daily  atorvastatin 80 milliGRAM(s) Oral at bedtime  clopidogrel Tablet 75 milliGRAM(s) Oral daily  enoxaparin Injectable 40 milliGRAM(s) SubCutaneous every 24 hours    PRN:  acetaminophen     Tablet .. 650 milliGRAM(s) Oral every 6 hours PRN  melatonin 3 milliGRAM(s) Oral at bedtime PRN      HOME MEDICATIONS:  Home Medications:      VITALS:   T(F): 97.9 ( @ 00:16), Max: 97.9 ( @ 00:16)  HR: 68 ( @ 00:16) (67 - 73)  BP: 140/74 ( @ 00:16) (129/68 - 140/74)  BP(mean): 96 ( @ 00:16) (96 - 98)  RR: 18 ( @ 00:16) (18 - 18)  SpO2: 98% ( @ 00:16) (97% - 100%)    I&O's Summary      REVIEW OF SYSTEMS:  CONSTITUTIONAL: No weakness, fevers or chills  HEENT: No visual changes, neck/ear pain  RESPIRATORY: No cough, sob  CARDIOVASCULAR: See HPI  GASTROINTESTINAL: No abdominal pain. No nausea, vomiting, diarrhea   GENITOURINARY: No dysuria, frequency or hematuria  NEUROLOGICAL: No new focal deficits  SKIN: No new rashes    PHYSICAL EXAM:  General: Not in distress.  Non-toxic appearing.   HEENT: EOMI  Cardio: regular, S1, S2, no murmur  Pulm: B/L BS.  No wheezing / crackles / rales  Abdomen: Soft, non-tender, non-distended. Normoactive bowel sounds  Extremities: No edema b/l le  Neuro: A&O x3. No focal deficits    LABS:                        13.2   8.69  )-----------( 291      ( 10 Mar 2025 12:05 )             41.9     03-10    135  |  101  |  10  ----------------------------<  105[H]  4.5   |  24  |  0.7    Ca    8.8      10 Mar 2025 12:05    TPro  6.5  /  Alb  4.0  /  TBili  <0.2  /  DBili  x   /  AST  18  /  ALT  17  /  AlkPhos  138[H]  03-10    PT/INR - ( 10 Mar 2025 12:05 )   PT: 10.70 sec;   INR: 0.91 ratio         PTT - ( 10 Mar 2025 12:05 )  PTT:33.2 sec    CARDIAC MARKERS ( 10 Mar 2025 12:05 )  x     / x     / x     / x     / <1.0 ng/mL      Troponin trend: < 6         RADIOLOGY:    -CXR:3/10/25   clear       -OTHER:  EC Lead ECG:   Ventricular Rate 70 BPM    Atrial Rate 70 BPM    P-R Interval 168 ms    QRS Duration 82 ms    Q-T Interval 400 ms    QTC Calculation(Bazett) 432 ms    P Axis 54 degrees    R Axis 64 degrees    T Axis -1 degrees    Diagnosis Line Normal sinus rhythm  Nonspecific ST and T wave abnormality  Abnormal ECG  Confirmed by Abiodun Bello ([id number]) on 3/10/2025 4:02:03 PM (03-10 @ 11:21)      TELEMETRY EVENTS:   HPI:  46 y/o woman w PMHx of uterine fibroids, presented for RUE tingling and CP, in ED had vitals wnl, trop <6, seen by neuro as stroke code and subsequently had CT Head, premedicated for contrast then CTA Head and Neck and MR brain noncon which was unremarkable, suggested to f/u as outpatient for neuro but was recommended to have cardio eval, admitted for CP workup, stroke workup.     Reports 1 week ago having 30min episode of midsternal chest pressure which resolved after taking 3 tylenol. Had been tying her shoes at the time, not exerting herself at the time, and was able to have an active rest of her day going shopping w/o difficulty. Had never had this kind of chest pressure before. Night prior to arrival around 9:30pm was making dinner when she had onset of chest pressure and tingling in the R fingers, w overall severity about 6/10 and while she was not able to sleep well, did go to bed. On waking this morning was planning to go to work but her partner brought her to the ED instead for evaluation of these symptoms. Has had some improvement in symptoms since then, and was hoping to go home. Does still have positional pain/discomfort w raising her R arm. 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   Not currently menstruating, LMP ended last week.     Triage vitals were: 129/68, HR 73bpm, 100% on RA, 97.8F   Labs notable for: trop <6,  but otherwise wnl - no elevated WBC, Cr. Electrolytes wnl.   Imaging: CXR w slight ?cardiomegaly, CT Head, CTA Head and C spine, MR brain w/o acute stroke.   Treatments: premedicated for contrast (10 Mar 2025 19:06)      ---  Cardiac Fellow Additional notes:    46 y/o woman w PMHx of uterine fibroids, presented for RUE tingling and CP.  Patient reports she has been having intermittent right sided chest pain since the last week at rest triggered with positional changes and alleviated with acetaminophen.   At baseline she is very functional with METS > 4         PAST MEDICAL & SURGICAL HISTORY  Fibroids    No significant past surgical history        ALLERGIES:  iodine (Unknown)  shellfish (Unknown)  penicillin (Hives; Angioedema)      MEDICATIONS:  aspirin  chewable 81 milliGRAM(s) Oral daily  atorvastatin 80 milliGRAM(s) Oral at bedtime  clopidogrel Tablet 75 milliGRAM(s) Oral daily  enoxaparin Injectable 40 milliGRAM(s) SubCutaneous every 24 hours    PRN:  acetaminophen     Tablet .. 650 milliGRAM(s) Oral every 6 hours PRN  melatonin 3 milliGRAM(s) Oral at bedtime PRN      VITALS:   T(F): 97.9 (03-11 @ 00:16), Max: 97.9 (03-11 @ 00:16)  HR: 68 (03-11 @ 00:16) (67 - 73)  BP: 140/74 (03-11 @ 00:16) (129/68 - 140/74)  BP(mean): 96 (03-11 @ 00:16) (96 - 98)  RR: 18 (03-11 @ 00:16) (18 - 18)  SpO2: 98% (03-11 @ 00:16) (97% - 100%)      REVIEW OF SYSTEMS:  CONSTITUTIONAL: No fever, weight loss, fatigue  NECK: No pain or stiffness  RESPIRATORY: See HPI  CARDIOVASCULAR: See HPI  GASTROINTESTINAL: No abdominal/epigastric pain, nausea, vomiting, hematemesis, diarrhea, constipation, melena or hematochezia  GENITOURINARY: No dysuria, frequency, hematuria, incontinence  NEUROLOGICAL: See HPI  SKIN: No itching, burning, rashes, lesions   ENDOCRINE: No heat/cold intolerance or hair loss  MUSCULOSKELETAL: No joint pain or swelling  HEME/LYMPH: No easy bruising or bleeding gums      PHYSICAL EXAM:  General: NAD, AAOx3  HEENT: NCAT, EOMI  Neck: supple, no JVD  CV: RRR, S1S2 nl, no murmurs, no edema  Respiratory: CTA bilaterally, no wheeze, rales or rhonchi	  Abdomen: soft, NT/ND, +BS  Extremities: warm  Neuro: RUE weakness / pain      LABS:                        13.2   8.69  )-----------( 291      ( 10 Mar 2025 12:05 )             41.9     03-10    135  |  101  |  10  ----------------------------<  105[H]  4.5   |  24  |  0.7    Ca    8.8      10 Mar 2025 12:05    TPro  6.5  /  Alb  4.0  /  TBili  <0.2  /  DBili  x   /  AST  18  /  ALT  17  /  AlkPhos  138[H]  03-10    PT/INR - ( 10 Mar 2025 12:05 )   PT: 10.70 sec;   INR: 0.91 ratio         PTT - ( 10 Mar 2025 12:05 )  PTT:33.2 sec    CARDIAC MARKERS ( 10 Mar 2025 12:05 )  x     / x     / x     / x     / <1.0 ng/mL      Troponin trend: < 6         RADIOLOGY:    -CXR:3/10/25   clear         12 Lead ECG:   Ventricular Rate 70 BPM    Atrial Rate 70 BPM    P-R Interval 168 ms    QRS Duration 82 ms    Q-T Interval 400 ms    QTC Calculation(Bazett) 432 ms    P Axis 54 degrees    R Axis 64 degrees    T Axis -1 degrees    Diagnosis Line Normal sinus rhythm  Nonspecific ST and T wave abnormality  Abnormal ECG        < from: MR Head No Cont (03.10.25 @ 17:51) >  IMPRESSION:  Faint focus of restricted diffusion within the left ventral tawana raising   concern for acute infarct.    < end of copied text >

## 2025-03-11 NOTE — DISCHARGE NOTE PROVIDER - NSDCMRMEDTOKEN_GEN_ALL_CORE_FT
EpiPen 2-Prem 0.3 mg injectable kit: 0.3 milligram(s) intramuscularly prn   EpiPen 2-Prem 0.3 mg injectable kit: 0.3 milligram(s) intramuscularly prn  nicotine 14 mg/24 hr transdermal film, extended release: 1 patch transdermally once a day BEFORE this medication finishes follow up with your primary care provider about getting the next step in nicotine cessatio (7mg/24hr for 2 weeks)  pantoprazole 40 mg oral granule, delayed release: 1 packet(s) orally once a day before breakfast   EpiPen 2-Prem 0.3 mg injectable kit: 0.3 milligram(s) intramuscularly prn  methocarbamol 500 mg oral tablet: 2 tab(s) orally 4 times a day as needed for  muscle spasm  nicotine 14 mg/24 hr transdermal film, extended release: 1 patch transdermally once a day BEFORE this medication finishes follow up with your primary care provider about getting the next step in nicotine cessatio (7mg/24hr for 2 weeks)  pantoprazole 40 mg oral granule, delayed release: 1 packet(s) orally once a day before breakfast

## 2025-03-11 NOTE — CONSULT NOTE ADULT - ASSESSMENT
IMPRESSION: Rehab of L stroke / R HP with paresthesia / uterine fibroids    PRECAUTIONS: [   ] Cardiac  [   ] Respiratory  [   ] Seizures [   ] Contact Isolation  [   ] Droplet Isolation  [   ] Other    Weight Bearing Status:     RECOMMENDATION:    Out of Bed to Chair     DVT/Decubiti Prophylaxis    REHAB PLAN:     [ x   ] Bedside P/T 3-5 times a week   [ x   ]   Bedside O/T  2-3 times a week             [    ] Speech Therapy               [    ]  No Rehab Therapy Indicated   Conditioning/ROM                                    ADL  Bed Mobility                                               Conditioning/ROM  Transfers                                                     Bed Mobility  Sitting /Standing Balance                         Transfers                                        Gait Training                                               Sitting/Standing Balance  Stair Training [   ]Applicable                    Home equipment Eval                                                                        Splinting  [   ] Only      GOALS:   ADL   [  x  ]   Independent                    Transfers  [  x  ] Independent                          Ambulation  [  x  ] Independent     [  x   ] With device                            [    ]  CG                                                         [    ]  CG                                                                  [    ] CG                            [    ] Min A                                                   [    ] Min A                                                              [    ] Min  A          DISCHARGE PLAN:   [    ]  Good candidate for Intensive Rehabilitation/Hospital based                                             Will tolerate 3hrs Intensive Rehab Daily                                       [     ]  Short Term Rehab in Skilled Nursing Facility                                       [     ]  Home with Outpatient or  services                                         [   x  ]  Possible Candidate for Intensive Hospital based Rehab

## 2025-03-11 NOTE — PROGRESS NOTE ADULT - ASSESSMENT
46 yo right handed  female with npmhx , not on any meications or supplements at home, + smoker (3-4 cigarettes /day), family history of father with Heart disease and stents at age 59 yo, mother + smoker with ? COPD, denies any family history of stroke in young presented today for midchest pain. Patient states that she experienced chest pain last tuesday which resolved spontaneously . Her chest pain returned this morning accompanied with right arm numbness/ tingling, b/l blurry vision and light headedness and she came to ed for further evaluation. C/O chest pain trop x 2 is negative and patient was initially admitted to medical team for chest pain work up.    -Left ventral pontine acute infarct: S/p dapt load with asa 325mg +[id number] x1  -Chest Pain : Trop x 2 negative , cardiology consultation pending  +Smoker    PLAN:  -Admit patient to neurology service to Dr Korey Lopez  -Continue Telemonitoring  -Neuro checks and vital signs q 4 hours  -S/p DAPT load , continue asa 81 mg + plavix 75mg q daily starting 3/11  -Lipitor 80 mg q daily  -Keep sbp 120-180  -ECHO with bubble study  -PT/REHAB evaluation and treat as needed  -Check LDL, Lipid profile, TSH  -DVT  Prophylaxis with Lovenox 40 mg sq and SCDs bilaterally  -Smoking cessation education initiated  -Case was discussed with neurology attending on call Dr Korey Lopez.    #stroke / suspected stroke  - [facility] [place]  - TTE  - cardiology consult for GET  - EP consult for ILR  - core measures: A1C, LDL, TSH  - antiplatelets/anticoagulants: s/p aspirin plavix load, aspirin 81mg daily , clopidogrel 75mg daily   - antilipemics: atorvastatin 80mg nightly   - SBP goal:   - q4h neuro and vitals checks      --------------------------------------------------  #prophylactic measures  - DVT prophylaxis: lovenox / SCDs  - GI prophylaxis: not indicated at this time  - Diet: DASH/TLC  --------------------------------------------------  #disposition -   - PT / OT / Physiatry advises discharge to:   - patient planning to discharge to:   44 yo right handed  female with npmhx , not on any medications or supplements at home, + smoker (3-4 cigarettes /day), family history of father with Heart disease and stents at age 59 yo, mother + smoker with ? COPD, denies any family history of stroke in young presented today for midchest pain. Patient states that she experienced chest pain last Tuesday which resolved spontaneously . Her chest pain returned this morning accompanied with right arm numbness/ tingling, b/l blurry vision and lightheadedness and she came to ed for further evaluation. C/O chest pain trop x 2 is negative and patient was initially admitted to medical team for chest pain work up.    -Left ventral pontine acute infarct: S/p dapt load with asa 325mg +[id number] x1  -Chest Pain : Trop x 2 negative , cardiology consultation pending  +Smoker    PLAN:  -Continue Telemonitoring  -Neuro checks and vital signs q 4 hours  -S/p DAPT load , continue asa 81 mg + plavix 75mg q daily starting 3/11  -Lipitor 80 mg q daily  -Keep sbp 120-180  -ECHO with bubble study  -PT/REHAB evaluation and treat as needed  -Check LDL, Lipid profile, TSH  -DVT  Prophylaxis with Lovenox 40 mg sq and SCDs bilaterally  -Smoking cessation education initiated  -Case was discussed with neurology attending on call Dr Korey Lopez.    #stroke / suspected stroke  - [facility] [place]  - TTE  - cardiology consult for GET  - EP consult for ILR  - core measures: A1C, LDL, TSH  - antiplatelets/anticoagulants: s/p aspirin plavix load, aspirin 81mg daily , clopidogrel 75mg daily   - antilipemics: atorvastatin 80mg nightly   - SBP goal:   - q4h neuro and vitals checks      --------------------------------------------------  #prophylactic measures  - DVT prophylaxis: lovenox / SCDs  - GI prophylaxis: not indicated at this time  - Diet: DASH/TLC  --------------------------------------------------  #disposition -   - PT / OT / Physiatry advises discharge to:   - patient planning to discharge to:   44 yo right handed  female with npmhx , not on any medications or supplements at home, + smoker (3-4 cigarettes /day), family history of father with Heart disease and stents at age 61 yo, mother + smoker with ? COPD, denies any family history of stroke in young presented today for midchest pain. Patient states that she experienced chest pain last Tuesday which resolved spontaneously . Her chest pain returned this morning accompanied with right arm numbness/ tingling, b/l blurry vision and lightheadedness and she came to ed for further evaluation. C/O chest pain trop x 2 is negative and patient was initially admitted to medical team for chest pain work up. Transferred to neurology because initial MRI showed left ventral tawana stroke (loaded with DAPT) however repeat MRH showed that this was likely artifactual. Suspect chest pain multifactorial such as GERD (had reflux here) vs muscle spasm (tender to palpation, exacerbated by arm movement), and sensory changes and weakness secondary to possible radiculopathy (feels a stretching from arm and leg to the back) vs complicated migraine (patient complaining of headache, reports significant stress at work). Cleared by cardiology for discharge.    PLAN  - discontinued aspirin plavix atorvastatin   - nicotine cessation on discharge  - patient to follow up with general neurology PRN if weakness persists or worses  - patient to follow up with primary care provider   - discharge home with OT 46 yo right handed  female with npmhx , not on any medications or supplements at home, + smoker (3-4 cigarettes /day), family history of father with Heart disease and stents at age 61 yo, mother + smoker with ? COPD, denies any family history of stroke in young presented today for midchest pain. Patient states that she experienced chest pain last Tuesday which resolved spontaneously . Her chest pain returned this morning accompanied with right arm numbness/ tingling, b/l blurry vision and lightheadedness and she came to ed for further evaluation. C/O chest pain trop x 2 is negative and patient was initially admitted to medical team for chest pain work up. Transferred to neurology because initial MRI showed left ventral tawana stroke (loaded with DAPT) however repeat MRH showed that this was likely artifactual. Suspect chest pain multifactorial such as GERD (had reflux here) vs muscle spasm (tender to palpation, exacerbated by arm movement), and sensory changes and weakness secondary to possible radiculopathy (feels a stretching from arm and leg to the back) vs complicated migraine (patient complaining of headache, reports significant stress at work). Cleared by cardiology for discharge.    PLAN  - discontinued aspirin plavix atorvastatin   - nicotine cessation on discharge  - protonix  - patient to follow up with general neurology PRN if weakness persists or worses  - patient to follow up with primary care provider   - discharge home with OT 46 yo right handed  female with npmhx , not on any medications or supplements at home, + smoker (3-4 cigarettes /day), family history of father with Heart disease and stents at age 61 yo, mother + smoker with ? COPD, denies any family history of stroke in young presented today for midchest pain. Patient states that she experienced chest pain last Tuesday which resolved spontaneously . Her chest pain returned this morning accompanied with right arm numbness/ tingling, b/l blurry vision and lightheadedness and she came to ed for further evaluation. C/O chest pain trop x 2 is negative and patient was initially admitted to medical team for chest pain work up. Transferred to neurology because initial MRI showed left ventral tawana stroke (loaded with DAPT) however repeat MRH showed that this was likely artifactual. Suspect chest pain multifactorial such as GERD (had reflux here) vs muscle spasm (tender to palpation, exacerbated by arm movement), and sensory changes and weakness secondary to possible radiculopathy (feels a stretching from arm and leg to the back) vs complicated migraine (patient complaining of headache, reports significant stress at work). Cleared by cardiology for discharge.    PLAN  - discontinued aspirin plavix atorvastatin   - nicotine cessation on discharge  - protonix  - discharge on robaxin 1000mg qid as needed for 2 days  - patient to follow up with general neurology PRN if weakness persists or worses  - patient to follow up with primary care provider   - discharge home with OT

## 2025-03-11 NOTE — PATIENT PROFILE ADULT - FALL HARM RISK - HARM RISK INTERVENTIONS

## 2025-03-11 NOTE — DISCHARGE NOTE NURSING/CASE MANAGEMENT/SOCIAL WORK - PATIENT PORTAL LINK FT
You can access the FollowMyHealth Patient Portal offered by [facility] by registering at the following website: [email or web address]. By joining [facility]’s FollowMyHealth portal, you will also be able to view your health information using other applications (apps) compatible with our system.

## 2025-03-11 NOTE — CONSULT NOTE ADULT - ATTENDING COMMENTS
Noncardiac Chest Pain  Questionable Lyn infarct  No ACS / NSTEMI    Imaging reviewed.  EKG:  NSR, no ischemic ST-T changes  CXR:  No CHF      - ECHO      Incomplete Noncardiac Chest Pain  Questionable Lyn infarct  No ACS / NSTEMI    Imaging reviewed.  EKG:  NSR, no ischemic ST-T changes  CXR:  No CHF  ECHO:  EF >55%, Mild TR    No further cardiac workup  Recall PRN

## 2025-03-17 ENCOUNTER — TRANSCRIPTION ENCOUNTER (OUTPATIENT)
Age: 45
End: 2025-03-17

## 2025-03-17 ENCOUNTER — APPOINTMENT (OUTPATIENT)
Age: 45
End: 2025-03-17
Payer: COMMERCIAL

## 2025-03-17 ENCOUNTER — NON-APPOINTMENT (OUTPATIENT)
Age: 45
End: 2025-03-17

## 2025-03-17 VITALS — DIASTOLIC BLOOD PRESSURE: 74 MMHG | OXYGEN SATURATION: 98 % | HEART RATE: 92 BPM | SYSTOLIC BLOOD PRESSURE: 109 MMHG

## 2025-03-17 VITALS — HEIGHT: 60 IN

## 2025-03-17 DIAGNOSIS — F41.9 ANXIETY DISORDER, UNSPECIFIED: ICD-10-CM

## 2025-03-17 DIAGNOSIS — Z87.891 PERSONAL HISTORY OF NICOTINE DEPENDENCE: ICD-10-CM

## 2025-03-17 DIAGNOSIS — Z82.49 FAMILY HISTORY OF ISCHEMIC HEART DISEASE AND OTHER DISEASES OF THE CIRCULATORY SYSTEM: ICD-10-CM

## 2025-03-17 DIAGNOSIS — Z80.9 FAMILY HISTORY OF MALIGNANT NEOPLASM, UNSPECIFIED: ICD-10-CM

## 2025-03-17 DIAGNOSIS — R20.0 ANESTHESIA OF SKIN: ICD-10-CM

## 2025-03-17 DIAGNOSIS — Z80.0 FAMILY HISTORY OF MALIGNANT NEOPLASM OF DIGESTIVE ORGANS: ICD-10-CM

## 2025-03-17 DIAGNOSIS — Z86.018 PERSONAL HISTORY OF OTHER BENIGN NEOPLASM: ICD-10-CM

## 2025-03-17 DIAGNOSIS — R07.89 OTHER CHEST PAIN: ICD-10-CM

## 2025-03-17 PROBLEM — D21.9 BENIGN NEOPLASM OF CONNECTIVE AND OTHER SOFT TISSUE, UNSPECIFIED: Chronic | Status: ACTIVE | Noted: 2025-03-10

## 2025-03-17 PROCEDURE — 99215 OFFICE O/P EST HI 40 MIN: CPT

## 2025-03-17 RX ORDER — METHOCARBAMOL 500 MG/1
500 TABLET, FILM COATED ORAL 4 TIMES DAILY
Refills: 0 | Status: ACTIVE | COMMUNITY

## 2025-03-17 RX ORDER — PANTOPRAZOLE 40 MG/1
40 TABLET, DELAYED RELEASE ORAL
Qty: 30 | Refills: 3 | Status: ACTIVE | COMMUNITY

## 2025-03-18 DIAGNOSIS — R07.89 OTHER CHEST PAIN: ICD-10-CM

## 2025-03-18 DIAGNOSIS — D25.9 LEIOMYOMA OF UTERUS, UNSPECIFIED: ICD-10-CM

## 2025-03-18 DIAGNOSIS — E66.9 OBESITY, UNSPECIFIED: ICD-10-CM

## 2025-03-18 DIAGNOSIS — Z88.0 ALLERGY STATUS TO PENICILLIN: ICD-10-CM

## 2025-03-18 DIAGNOSIS — K21.9 GASTRO-ESOPHAGEAL REFLUX DISEASE WITHOUT ESOPHAGITIS: ICD-10-CM

## 2025-03-18 DIAGNOSIS — Z87.891 PERSONAL HISTORY OF NICOTINE DEPENDENCE: ICD-10-CM

## 2025-03-18 DIAGNOSIS — Z82.49 FAMILY HISTORY OF ISCHEMIC HEART DISEASE AND OTHER DISEASES OF THE CIRCULATORY SYSTEM: ICD-10-CM

## 2025-03-18 DIAGNOSIS — Z91.041 RADIOGRAPHIC DYE ALLERGY STATUS: ICD-10-CM

## 2025-03-18 DIAGNOSIS — M62.838 OTHER MUSCLE SPASM: ICD-10-CM

## 2025-04-18 ENCOUNTER — APPOINTMENT (OUTPATIENT)
Dept: CARDIOLOGY | Facility: CLINIC | Age: 45
End: 2025-04-18

## 2025-04-18 VITALS
WEIGHT: 171 LBS | HEART RATE: 70 BPM | BODY MASS INDEX: 33.57 KG/M2 | SYSTOLIC BLOOD PRESSURE: 81 MMHG | HEIGHT: 60 IN | DIASTOLIC BLOOD PRESSURE: 60 MMHG

## 2025-04-18 DIAGNOSIS — E11.9 TYPE 2 DIABETES MELLITUS W/OUT COMPLICATIONS: ICD-10-CM

## 2025-04-18 DIAGNOSIS — R07.89 OTHER CHEST PAIN: ICD-10-CM

## 2025-04-18 PROCEDURE — 93000 ELECTROCARDIOGRAM COMPLETE: CPT

## 2025-04-18 PROCEDURE — 99214 OFFICE O/P EST MOD 30 MIN: CPT | Mod: 25

## 2025-04-24 PROBLEM — E11.9 TYPE 2 DIABETES MELLITUS WITHOUT COMPLICATION, WITHOUT LONG-TERM CURRENT USE OF INSULIN: Status: ACTIVE | Noted: 2025-04-24

## 2025-05-19 ENCOUNTER — APPOINTMENT (OUTPATIENT)
Dept: CARDIOLOGY | Facility: CLINIC | Age: 45
End: 2025-05-19